# Patient Record
Sex: MALE | Race: OTHER | Employment: FULL TIME | URBAN - METROPOLITAN AREA
[De-identification: names, ages, dates, MRNs, and addresses within clinical notes are randomized per-mention and may not be internally consistent; named-entity substitution may affect disease eponyms.]

---

## 2023-07-06 ENCOUNTER — HOSPITAL ENCOUNTER (OUTPATIENT)
Age: 48
Discharge: HOME | End: 2023-07-06
Payer: COMMERCIAL

## 2023-07-06 VITALS
RESPIRATION RATE: 16 BRPM | DIASTOLIC BLOOD PRESSURE: 82 MMHG | HEART RATE: 94 BPM | OXYGEN SATURATION: 100 % | TEMPERATURE: 98 F | SYSTOLIC BLOOD PRESSURE: 151 MMHG

## 2023-07-06 DIAGNOSIS — N48.1 BALANITIS: Primary | ICD-10-CM

## 2023-07-06 PROCEDURE — 99203 OFFICE O/P NEW LOW 30 MIN: CPT | Performed by: NURSE PRACTITIONER

## 2023-07-06 RX ORDER — FLUCONAZOLE 150 MG/1
150 TABLET ORAL ONCE
Qty: 2 TABLET | Refills: 0 | Status: SHIPPED | OUTPATIENT
Start: 2023-07-06 | End: 2023-07-06

## 2023-07-06 RX ORDER — CLOTRIMAZOLE 1 %
1 CREAM (GRAM) TOPICAL 2 TIMES DAILY
Qty: 12 G | Refills: 0 | Status: SHIPPED | OUTPATIENT
Start: 2023-07-06 | End: 2023-07-13

## 2023-07-06 NOTE — ED INITIAL ASSESSMENT (HPI)
Patient c/o painful red rash on penis which he noticed ~ 2 weeks ago.  States had sexual intercourse 3 weeks ago and symptoms started after

## 2023-07-06 NOTE — DISCHARGE INSTRUCTIONS
Please pull the foreskin back and keep the area clean and dry. Apply ointment as prescribed.   Follow-up with primary care provider

## 2023-07-07 LAB
C TRACH DNA SPEC QL NAA+PROBE: NEGATIVE
N GONORRHOEA DNA SPEC QL NAA+PROBE: NEGATIVE

## 2024-01-22 ENCOUNTER — HOSPITAL ENCOUNTER (OUTPATIENT)
Age: 49
Discharge: HOME | End: 2024-01-22
Payer: COMMERCIAL

## 2024-01-22 ENCOUNTER — LAB ENCOUNTER (OUTPATIENT)
Dept: LAB | Age: 49
End: 2024-01-22
Attending: STUDENT IN AN ORGANIZED HEALTH CARE EDUCATION/TRAINING PROGRAM
Payer: COMMERCIAL

## 2024-01-22 ENCOUNTER — APPOINTMENT (OUTPATIENT)
Dept: GENERAL RADIOLOGY | Age: 49
End: 2024-01-22
Attending: NURSE PRACTITIONER
Payer: COMMERCIAL

## 2024-01-22 ENCOUNTER — OFFICE VISIT (OUTPATIENT)
Dept: INTERNAL MEDICINE CLINIC | Facility: CLINIC | Age: 49
End: 2024-01-22

## 2024-01-22 VITALS — HEART RATE: 89 BPM | DIASTOLIC BLOOD PRESSURE: 73 MMHG | SYSTOLIC BLOOD PRESSURE: 124 MMHG

## 2024-01-22 VITALS
TEMPERATURE: 100 F | OXYGEN SATURATION: 99 % | RESPIRATION RATE: 18 BRPM | SYSTOLIC BLOOD PRESSURE: 111 MMHG | DIASTOLIC BLOOD PRESSURE: 67 MMHG | HEART RATE: 97 BPM

## 2024-01-22 DIAGNOSIS — Z76.89 ENCOUNTER TO ESTABLISH CARE WITH NEW DOCTOR: ICD-10-CM

## 2024-01-22 DIAGNOSIS — E11.69 TYPE 2 DIABETES MELLITUS WITH OTHER SPECIFIED COMPLICATION, UNSPECIFIED WHETHER LONG TERM INSULIN USE (HCC): ICD-10-CM

## 2024-01-22 DIAGNOSIS — E78.5 DYSLIPIDEMIA: ICD-10-CM

## 2024-01-22 DIAGNOSIS — Z76.89 ENCOUNTER TO ESTABLISH CARE WITH NEW DOCTOR: Primary | ICD-10-CM

## 2024-01-22 DIAGNOSIS — E11.9 NEW ONSET TYPE 2 DIABETES MELLITUS (HCC): Primary | ICD-10-CM

## 2024-01-22 DIAGNOSIS — B34.9 VIRAL ILLNESS: ICD-10-CM

## 2024-01-22 DIAGNOSIS — E87.1 HYPONATREMIA: ICD-10-CM

## 2024-01-22 DIAGNOSIS — D72.829 LEUKOCYTOSIS, UNSPECIFIED TYPE: ICD-10-CM

## 2024-01-22 LAB
#MXD IC: 1.2 X10ˆ3/UL (ref 0.1–1)
ALBUMIN SERPL-MCNC: 4.6 G/DL (ref 3.2–4.8)
ALBUMIN/GLOB SERPL: 1.4 {RATIO} (ref 1–2)
ALP LIVER SERPL-CCNC: 92 U/L
ALT SERPL-CCNC: 24 U/L
ANION GAP SERPL CALC-SCNC: 9 MMOL/L (ref 0–18)
AST SERPL-CCNC: 18 U/L (ref ?–34)
BASOPHILS # BLD AUTO: 0.04 X10(3) UL (ref 0–0.2)
BASOPHILS NFR BLD AUTO: 0.4 %
BILIRUB SERPL-MCNC: 0.7 MG/DL (ref 0.3–1.2)
BILIRUB UR QL STRIP: NEGATIVE
BUN BLD-MCNC: 15 MG/DL (ref 7–18)
BUN BLD-MCNC: 15 MG/DL (ref 9–23)
BUN/CREAT SERPL: 10.9 (ref 10–20)
CALCIUM BLD-MCNC: 9.5 MG/DL (ref 8.7–10.4)
CARTRIDGE EXPIRATION DATE: ABNORMAL DATE
CARTRIDGE LOT#: ABNORMAL NUMERIC
CHLORIDE BLD-SCNC: 96 MMOL/L (ref 98–112)
CHLORIDE SERPL-SCNC: 98 MMOL/L (ref 98–112)
CHOLEST SERPL-MCNC: 172 MG/DL (ref ?–200)
CLARITY UR: CLEAR
CO2 BLD-SCNC: 26 MMOL/L (ref 21–32)
CO2 SERPL-SCNC: 26 MMOL/L (ref 21–32)
COLOR UR: YELLOW
CREAT BLD-MCNC: 0.8 MG/DL
CREAT BLD-MCNC: 1.37 MG/DL
CREAT UR-SCNC: 38.7 MG/DL
DEPRECATED RDW RBC AUTO: 36.8 FL (ref 35.1–46.3)
EGFRCR SERPLBLD CKD-EPI 2021: 109 ML/MIN/1.73M2 (ref 60–?)
EGFRCR SERPLBLD CKD-EPI 2021: 64 ML/MIN/1.73M2 (ref 60–?)
EOSINOPHIL # BLD AUTO: 0.04 X10(3) UL (ref 0–0.7)
EOSINOPHIL NFR BLD AUTO: 0.4 %
ERYTHROCYTE [DISTWIDTH] IN BLOOD BY AUTOMATED COUNT: 11.4 % (ref 11–15)
FASTING PATIENT LIPID ANSWER: NO
FASTING STATUS PATIENT QL REPORTED: NO
GLOBULIN PLAS-MCNC: 3.2 G/DL (ref 2.8–4.4)
GLUCOSE BLD-MCNC: 292 MG/DL (ref 70–99)
GLUCOSE BLD-MCNC: 501 MG/DL (ref 70–99)
GLUCOSE UR STRIP-MCNC: 500 MG/DL
HCT VFR BLD AUTO: 41.6 %
HCT VFR BLD AUTO: 45.1 %
HCT VFR BLD CALC: 51 %
HDLC SERPL-MCNC: 34 MG/DL (ref 40–59)
HEMOGLOBIN A1C: 12.7 % (ref 4.3–5.6)
HGB BLD-MCNC: 14.6 G/DL
HGB BLD-MCNC: 15.6 G/DL
IMM GRANULOCYTES # BLD AUTO: 0.03 X10(3) UL (ref 0–1)
IMM GRANULOCYTES NFR BLD: 0.3 %
INSULIN SERPL-ACNC: 15.1 MU/L (ref 3–25)
ISTAT IONIZED CALCIUM FOR CHEM 8: 1.19 MMOL/L (ref 1.12–1.32)
KETONES UR STRIP-MCNC: 40 MG/DL
LDLC SERPL CALC-MCNC: 96 MG/DL (ref ?–100)
LEUKOCYTE ESTERASE UR QL STRIP: NEGATIVE
LYMPHOCYTES # BLD AUTO: 1.44 X10(3) UL (ref 1–4)
LYMPHOCYTES # BLD AUTO: 1.5 X10ˆ3/UL (ref 1–4)
LYMPHOCYTES NFR BLD AUTO: 10.9 %
LYMPHOCYTES NFR BLD AUTO: 13.5 %
MCH RBC QN AUTO: 29.8 PG (ref 26–34)
MCH RBC QN AUTO: 30.6 PG (ref 26–34)
MCHC RBC AUTO-ENTMCNC: 34.6 G/DL (ref 31–37)
MCHC RBC AUTO-ENTMCNC: 35.1 G/DL (ref 31–37)
MCV RBC AUTO: 86.1 FL (ref 80–100)
MCV RBC AUTO: 87.2 FL
MICROALBUMIN UR-MCNC: 1.8 MG/DL
MICROALBUMIN/CREAT 24H UR-RTO: 46.5 UG/MG (ref ?–30)
MIXED CELL %: 8.9 %
MONOCYTES # BLD AUTO: 1.06 X10(3) UL (ref 0.1–1)
MONOCYTES NFR BLD AUTO: 9.9 %
NEUTROPHILS # BLD AUTO: 10.7 X10ˆ3/UL (ref 1.5–7.7)
NEUTROPHILS # BLD AUTO: 8.08 X10 (3) UL (ref 1.5–7.7)
NEUTROPHILS # BLD AUTO: 8.08 X10(3) UL (ref 1.5–7.7)
NEUTROPHILS NFR BLD AUTO: 75.5 %
NEUTROPHILS NFR BLD AUTO: 80.2 %
NITRITE UR QL STRIP: NEGATIVE
NONHDLC SERPL-MCNC: 138 MG/DL (ref ?–130)
OSMOLALITY SERPL CALC.SUM OF ELEC: 299 MOSM/KG (ref 275–295)
PH UR STRIP: 5.5 [PH]
PLATELET # BLD AUTO: 165 10(3)UL (ref 150–450)
PLATELET # BLD AUTO: 171 X10ˆ3/UL (ref 150–450)
POCT INFLUENZA A: NEGATIVE
POCT INFLUENZA B: NEGATIVE
POTASSIUM BLD-SCNC: 4.4 MMOL/L (ref 3.6–5.1)
POTASSIUM SERPL-SCNC: 4.3 MMOL/L (ref 3.5–5.1)
PROT SERPL-MCNC: 7.8 G/DL (ref 5.7–8.2)
PROT UR STRIP-MCNC: 100 MG/DL
RBC # BLD AUTO: 4.77 X10(6)UL
RBC # BLD AUTO: 5.24 X10ˆ6/UL
S PYO AG THROAT QL: NEGATIVE
SARS-COV-2 RNA RESP QL NAA+PROBE: NOT DETECTED
SODIUM BLD-SCNC: 135 MMOL/L (ref 136–145)
SODIUM SERPL-SCNC: 133 MMOL/L (ref 136–145)
SP GR UR STRIP: 1.02
TRIGL SERPL-MCNC: 249 MG/DL (ref 30–149)
UROBILINOGEN UR STRIP-ACNC: <2 MG/DL
VLDLC SERPL CALC-MCNC: 41 MG/DL (ref 0–30)
WBC # BLD AUTO: 10.7 X10(3) UL (ref 4–11)
WBC # BLD AUTO: 13.4 X10ˆ3/UL (ref 4–11)

## 2024-01-22 PROCEDURE — 80061 LIPID PANEL: CPT

## 2024-01-22 PROCEDURE — 86341 ISLET CELL ANTIBODY: CPT

## 2024-01-22 PROCEDURE — A9150 MISC/EXPER NON-PRESCRIPT DRU: HCPCS | Performed by: NURSE PRACTITIONER

## 2024-01-22 PROCEDURE — 87502 INFLUENZA DNA AMP PROBE: CPT | Performed by: NURSE PRACTITIONER

## 2024-01-22 PROCEDURE — 81002 URINALYSIS NONAUTO W/O SCOPE: CPT | Performed by: NURSE PRACTITIONER

## 2024-01-22 PROCEDURE — 3060F POS MICROALBUMINURIA REV: CPT | Performed by: NURSE PRACTITIONER

## 2024-01-22 PROCEDURE — U0002 COVID-19 LAB TEST NON-CDC: HCPCS | Performed by: NURSE PRACTITIONER

## 2024-01-22 PROCEDURE — 82043 UR ALBUMIN QUANTITATIVE: CPT

## 2024-01-22 PROCEDURE — 71046 X-RAY EXAM CHEST 2 VIEWS: CPT | Performed by: NURSE PRACTITIONER

## 2024-01-22 PROCEDURE — 80053 COMPREHEN METABOLIC PANEL: CPT

## 2024-01-22 PROCEDURE — 85025 COMPLETE CBC W/AUTO DIFF WBC: CPT | Performed by: NURSE PRACTITIONER

## 2024-01-22 PROCEDURE — 3061F NEG MICROALBUMINURIA REV: CPT | Performed by: NURSE PRACTITIONER

## 2024-01-22 PROCEDURE — 84681 ASSAY OF C-PEPTIDE: CPT

## 2024-01-22 PROCEDURE — 83525 ASSAY OF INSULIN: CPT

## 2024-01-22 PROCEDURE — 99214 OFFICE O/P EST MOD 30 MIN: CPT | Performed by: NURSE PRACTITIONER

## 2024-01-22 PROCEDURE — 36415 COLL VENOUS BLD VENIPUNCTURE: CPT

## 2024-01-22 PROCEDURE — 86337 INSULIN ANTIBODIES: CPT

## 2024-01-22 PROCEDURE — 87880 STREP A ASSAY W/OPTIC: CPT | Performed by: NURSE PRACTITIONER

## 2024-01-22 PROCEDURE — 80047 BASIC METABLC PNL IONIZED CA: CPT | Performed by: NURSE PRACTITIONER

## 2024-01-22 PROCEDURE — 82570 ASSAY OF URINE CREATININE: CPT

## 2024-01-22 RX ORDER — IBUPROFEN 600 MG/1
600 TABLET ORAL ONCE
Status: COMPLETED | OUTPATIENT
Start: 2024-01-22 | End: 2024-01-22

## 2024-01-22 RX ORDER — ROSUVASTATIN CALCIUM 20 MG/1
20 TABLET, COATED ORAL NIGHTLY
Qty: 90 TABLET | Refills: 3 | Status: SHIPPED | OUTPATIENT
Start: 2024-01-22 | End: 2025-01-16

## 2024-01-22 RX ORDER — SODIUM CHLORIDE 9 MG/ML
1000 INJECTION, SOLUTION INTRAVENOUS ONCE
Status: COMPLETED | OUTPATIENT
Start: 2024-01-22 | End: 2024-01-22

## 2024-01-22 RX ORDER — INSULIN DEGLUDEC 100 U/ML
10 INJECTION, SOLUTION SUBCUTANEOUS DAILY
Qty: 1 EACH | Refills: 1 | Status: SHIPPED | OUTPATIENT
Start: 2024-01-22 | End: 2024-04-21

## 2024-01-22 RX ORDER — SEMAGLUTIDE 0.68 MG/ML
INJECTION, SOLUTION SUBCUTANEOUS
Qty: 3 ML | Refills: 0 | Status: SHIPPED | OUTPATIENT
Start: 2024-01-22 | End: 2024-03-05

## 2024-01-22 RX ORDER — ACETAMINOPHEN 500 MG
1000 TABLET ORAL ONCE
Status: COMPLETED | OUTPATIENT
Start: 2024-01-22 | End: 2024-01-22

## 2024-01-22 RX ORDER — PEN NEEDLE, DIABETIC 30 GX3/16"
1 NEEDLE, DISPOSABLE MISCELLANEOUS
Qty: 400 EACH | Refills: 9 | Status: SHIPPED | OUTPATIENT
Start: 2024-01-22 | End: 2024-04-21

## 2024-01-22 RX ORDER — LANCETS 33 GAUGE
1 EACH MISCELLANEOUS 2 TIMES DAILY
Qty: 200 EACH | Refills: 2 | Status: SHIPPED | OUTPATIENT
Start: 2024-01-22 | End: 2024-05-01

## 2024-01-22 RX ORDER — BLOOD SUGAR DIAGNOSTIC
1 STRIP MISCELLANEOUS 2 TIMES DAILY
Qty: 200 STRIP | Refills: 0 | Status: SHIPPED | OUTPATIENT
Start: 2024-01-22 | End: 2024-05-01

## 2024-01-22 RX ORDER — BLOOD-GLUCOSE METER
1 EACH MISCELLANEOUS 2 TIMES DAILY
Qty: 1 KIT | Refills: 0 | Status: SHIPPED | OUTPATIENT
Start: 2024-01-22

## 2024-01-22 NOTE — PROGRESS NOTES
OFFICE NOTE     Patient ID: Dawson Ibarra is a 48 year old male.  Today's Date: 01/22/24  Chief Complaint: Urgent Care F/u    Sheree  [id number]    Pt is a 47y/o Senegalese speaking male whom presents to clinic to establish care after being seen in urgent care earlier today. Pt reports of fever, fatigue,myalgia and headache, and abdominal pain without nausea/vomiting, diarrhea or constipation.   Pt reports polydipsia and polyuria, cough for 2 months with difficulty sleeping at night due to cough.   Patient denies any chest pain, dyspnea on exertion, nor hemoptysis.     Diabetes  He presents for his initial diabetic visit. Hypoglycemia symptoms include nervousness/anxiousness. Pertinent negatives for hypoglycemia include no confusion, dizziness, headaches, hunger, mood changes, pallor, seizures, sleepiness, speech difficulty, sweats or tremors. Associated symptoms include fatigue, polydipsia, polyphagia, polyuria, weakness and weight loss. Pertinent negatives for diabetes include no blurred vision, no chest pain and no foot ulcerations. There are no hypoglycemic complications. There are no diabetic complications. Risk factors for coronary artery disease include male sex, stress, family history, dyslipidemia and diabetes mellitus. When asked about current treatments, none were reported.         Vitals:    01/22/24 1439   BP: 124/73   Pulse: 89     body mass index is unknown because there is no height or weight on file.  BP Readings from Last 3 Encounters:   01/22/24 124/73   01/22/24 111/67   07/06/23 151/82     The ASCVD Risk score (Laureano DK, et al., 2019) failed to calculate for the following reasons:    Cannot find a previous HDL lab    Cannot find a previous total cholesterol lab      Medications reviewed:  Current Outpatient Medications   Medication Sig Dispense Refill    Blood Glucose Monitoring Suppl (ONETOUCH VERIO) w/Device Does not apply Kit 1 strip by In Vitro route in the morning and 1  strip before bedtime. 1 kit 0    Glucose Blood (ONETOUCH VERIO) In Vitro Strip 1 strip by In Vitro route in the morning and 1 strip before bedtime. 200 strip 0    OneTouch Delica Lancets 33G Does not apply Misc Inject 1 Lancet as directed in the morning and 1 Lancet before bedtime. 200 each 2    Insulin Degludec (TRESIBA) 100 UNIT/ML Subcutaneous Solution Inject 10 Units into the skin daily. 1 each 1    Insulin Pen Needle 33G X 4 MM Does not apply Misc 1 Application daily. 100 each 3    metFORMIN 500 MG Oral Tab Take 1 tablet (500 mg total) by mouth 2 (two) times daily with meals for 30 days, THEN 2 tablets (1,000 mg total) 2 (two) times daily with meals. 300 tablet 0    Insulin Pen Needle (PEN NEEDLES) 32G X 4 MM Does not apply Misc 1 each 4 (four) times daily before meals and nightly. 400 each 9    semaglutide (OZEMPIC, 0.25 OR 0.5 MG/DOSE,) 2 MG/3ML Subcutaneous Solution Pen-injector Inject 0.25 mg into the skin once a week for 30 days, THEN 0.5 mg once a week for 14 days. SEE ME AFTER WEEK 4 FOR REFILLS/ADJUSTMENT. NO REFILLS ON THIS RX.. 3 mL 0    rosuvastatin 20 MG Oral Tab Take 1 tablet (20 mg total) by mouth nightly. FOR CHOLESTEROL. 90 tablet 3         Assessment & Plan    1. Encounter to establish care with new doctor (Primary)  -     POC Glycohemoglobin [76153]  -     Comp Metabolic Panel (14); Future; Expected date: 01/22/2024  -     CBC With Differential With Platelet; Future; Expected date: 01/22/2024  2. Type 2 diabetes mellitus with other specified complication, unspecified whether long term insulin use (HCC)  -     POC Glycohemoglobin [[id number]]  -     OPHTHALMOLOGY - INTERNAL  -     PODIATRY - INTERNAL  -     PCV20 VACCINE FOR INTRAMUSCULAR USE  -     Microalb/Creat Ratio, Random Urine; Future; Expected date: 01/22/2024  -     Lipid Panel; Future; Expected date: 01/22/2024  -     OneTouch Verio; 1 strip by In Vitro route in the morning and 1 strip before bedtime.  Dispense: 1 kit; Refill: 0  -      OneTouch Verio; 1 strip by In Vitro route in the morning and 1 strip before bedtime.  Dispense: 200 strip; Refill: 0  -     OneTouch Delica Lancets 33G; Inject 1 Lancet as directed in the morning and 1 Lancet before bedtime.  Dispense: 200 each; Refill: 2  -     Diabetic Retinopathy Exam; Future; Expected date: 01/22/2024  -     TONI-65 Autoantibody; Future; Expected date: 01/22/2024  -     INSULIN AND C-PEPTIDE, SERUM; Future; Expected date: 01/22/2024  -     Insulin Antibodies; Future; Expected date: 01/22/2024  -     Insulin Degludec; Inject 10 Units into the skin daily.  Dispense: 1 each; Refill: 1  -     Insulin Pen Needle; 1 Application daily.  Dispense: 100 each; Refill: 3  -     ENDOCRINOLOGY - INTERNAL  -     metFORMIN HCl; Take 1 tablet (500 mg total) by mouth 2 (two) times daily with meals for 30 days, THEN 2 tablets (1,000 mg total) 2 (two) times daily with meals.  Dispense: 300 tablet; Refill: 0  -     Pen Needles; 1 each 4 (four) times daily before meals and nightly.  Dispense: 400 each; Refill: 9  -     Referral to [facility]  -     Diabetes Navigator  -     Ozempic (0.25 or 0.5 MG/DOSE); Inject 0.25 mg into the skin once a week for 30 days, THEN 0.5 mg once a week for 14 days. SEE ME AFTER WEEK 4 FOR REFILLS/ADJUSTMENT. NO REFILLS ON THIS RX..  Dispense: 3 mL; Refill: 0  -     Rosuvastatin Calcium; Take 1 tablet (20 mg total) by mouth nightly. FOR CHOLESTEROL.  Dispense: 90 tablet; Refill: 3  -     Comp Metabolic Panel (14); Future; Expected date: 01/22/2024  -     CBC With Differential With Platelet; Future; Expected date: 01/22/2024  Given FMHx and Clinical presention pt with DM given recent Last A1c value was 12.7% done 1/22/2024.      Plan:  -If unclear if DM1 or 2 order TONI, Insulin and C-Peptide.   -ordered fingerstick glucometer, lancets and test strips daily  Metformin start with 500 mg daily for a week, increase to 500 mg twice a day for a week, then 1000 mg at night and 500  mg in the morning for a week, then increase to 1000 mg twice day. If getting diarrhea, wait and do not increase the dose till the diarrhea resolves.   --Given patients elevated Last A1c value was 12.7% done 1/22/2024.   patient would be a candidate for GLP-1 agnoist , discussed preclusion criteria that patient does not have a history of Pancreatitis.  Will start Ozempic (2.5mcg/subcutaneous weekly Monday for next 4 weeks, then titrate to 5.0mcg/weekly Feb 19th, 2024)  -Given elevated A1C, unlikely to reduce with po pills alone. Start long acting Insulin Tresiba 10units daily  -prescribe Glucometer test kit, Monitor ACHS and keep log.  Let us know if any medication is not covered so we change to preferred agent by insurance.   -Referral given to Endocrine, next available for further evaluation   -Refer for retinal screening (if inconclusive) refer to opthalomology  -Refer to Podiatry for rountine screening for diabetic patients  -[place] Prevnar today in clinic  -Refer to diabetes educator and Diabetes navigator  -Follow up in 2-4 weeks pending clinical improvement   3. Dyslipidemia  -     Rosuvastatin Calcium; Take 1 tablet (20 mg total) by mouth nightly. FOR CHOLESTEROL.  Dispense: 90 tablet; Refill: 3  Given newly diagnosed DM, will need high intenstity statin for Cardioprotective effect. Start rousvastatin 20mg nightly indefinitely.       Follow Up: As needed/if symptoms worsen or Return in about 4 weeks (around 2/19/2024) for Diabetes and Physical Exam..         Objective/ Results:   Physical Exam  Constitutional:       Appearance: He is well-developed.   Cardiovascular:      Rate and Rhythm: Normal rate and regular rhythm.      Heart sounds: Normal heart sounds.   Pulmonary:      Effort: Pulmonary effort is normal.      Breath sounds: Normal breath sounds.   Abdominal:      General: Bowel sounds are normal.      Palpations: Abdomen is soft.   Skin:     General: Skin is warm and dry.   Neurological:       Mental Status: He is alert and oriented to person, place, and time.      Deep Tendon Reflexes: Reflexes are normal and symmetric.        Reviewed:    There are no problems to display for this patient.     No Known Allergies     Social History     Socioeconomic History    Marital status:    Tobacco Use    Smoking status: Never    Smokeless tobacco: Never   Vaping Use    Vaping Use: Never used   Substance and Sexual Activity    Alcohol use: Yes     Comment: social    Drug use: Never      Review of Systems   Constitutional:  Positive for fatigue and weight loss.   Eyes:  Negative for blurred vision.   Respiratory: Negative.     Cardiovascular: Negative.  Negative for chest pain.   Gastrointestinal: Negative.    Endocrine: Positive for polydipsia, polyphagia and polyuria.   Skin: Negative.  Negative for pallor.   Neurological:  Positive for weakness. Negative for dizziness, tremors, seizures, speech difficulty and headaches.   Psychiatric/Behavioral:  Negative for confusion. The patient is nervous/anxious.      All other systems negative unless otherwise stated in ROS or HPI above.       Santo Graham MD  Internal Medicine       Call office with any questions or seek emergency care if necessary.   Patient understands and agrees to follow directions and advice.      ----------------------------------------- PATIENT INSTRUCTIONS-----------------------------------------     There are no Patient Instructions on file for this visit.

## 2024-01-22 NOTE — ED INITIAL ASSESSMENT (HPI)
Pt reports fever, headache, stomach pain beginning 2 weeks ago. Denies n/v/d.  Reports subjective temps, didn't take temperature.  Reports hx of abdominal hernia.

## 2024-01-22 NOTE — ED PROVIDER NOTES
Patient Seen in: [facility]    History   CC: fever  HPI: Dawson Ibarra 48 year old male  who presents c/o fever, generalized fatigue and myalgias, headaches, abd pains without n/v/d/c. +increased thirst and urination per pt x2wks. Denies dysuria, hesitancy, hematuria. Denies rash. +cough x2mo. Difficulty sleeping at night d/t cough. Denies cp, chaitanya, hemoptysis.   [id number] used for interview and exam.    History reviewed. No pertinent past medical history.    History reviewed. No pertinent surgical history.    No family history on file.    Social History     Socioeconomic History    Marital status:    Tobacco Use    Smoking status: Never    Smokeless tobacco: Never       ROS:  Review of Systems    Positive for stated complaint: abdominal pain  Other systems are as noted in HPI.  Constitutional and vital signs reviewed.      All other systems reviewed and negative except as noted above.    PSFH elements reviewed from today and agreed except as otherwise stated in HPI.             Constitutional and vital signs reviewed.        Physical Exam     ED Triage Vitals [01/22/24 1024]   /85   Pulse 107   Resp 16   Temp 98.4 °F (36.9 °C)   Temp src Temporal   SpO2 100 %   O2 Device None (Room air)       Current:/67   Pulse 97   Temp 99.7 °F (37.6 °C) (Oral)   Resp 18   SpO2 99%         PE:  General - Appears uncomfortable however non-toxic and in NAD  Head - Appears symmetrical without deformity/swelling cranium, scalp, or facial bones  Eyes - PERRL, sclera not injected, no discharge noted, no periorbital edema,   ENT - EAC bilaterally without discharge, TM pearly grey with COL visualized appropriately bilaterally.   no nasal drainage noted in nares bilat, no cobblestoning to post. Pharynx.   Oropharynx clear, posterior pharynx is without erythema and without tonsilar enlargement or exudate, uvula midline, +gag, voice is clear. No trismus  Neck - no significant  adenopathy, supple with trachea midline  Resp - Lung sounds clear bilaterally and wob unlabored, good aeration with equal, even expansion bilaterally   CV - RRR  GI - Appears round and flat, BS +x4 quadrants, no tenderness/guarding with palpation   - no CVA tenderness  Skin - no rashes or petechiae noted, pink warm and dry throughout, mmm, cap refill <2seconds  Neuro - A&O x4, sensation equal to both medial and lateral aspects of extremities, steady gait  MSK - makes purposeful movements of all extremities  Psych - Interactive and appropriate      ED Course     Labs Reviewed   POCT CBC - Abnormal; Notable for the following components:       Result Value    WBC IC 13.4 (*)     # Neutrophil 10.7 (*)     # Mixed Cells 1.2 (*)     All other components within normal limits   POCT ISTAT CHEM8 CARTRIDGE - Abnormal; Notable for the following components:    ISTAT Sodium 135 (*)     ISTAT Chloride 96 (*)     ISTAT Glucose 292 (*)     All other components within normal limits   [facility] POCT URINALYSIS DIPSTICK - Abnormal; Notable for the following components:    Protein urine 100 (*)     Glucose, Urine 500 (*)     Ketone, Urine 40 (*)     Blood, Urine Small (*)     All other components within normal limits   POCT RAPID STREP - Normal   RAPID SARS-COV-2 BY PCR - Normal   POCT FLU TEST - Normal    Narrative:     This assay is a rapid molecular in vitro test utilizing nucleic acid amplification of influenza A and B viral RNA.       [facility]     XR CHEST PA + LAT CHEST (CPT=71046) (Final result)  Result time 01/22/24 11:12:22  Final result by Jonh Huber MD (01/22/24 11:12:22)                Impression:    CONCLUSION: Normal           Dictated by (CST): Iftikhar Huber MD on 1/22/2024 at 11:11 AM      Finalized by (CST): Iftikhar Huber MD on 1/22/2024 at 11:12 AM                  Narrative:    PROCEDURE: XR CHEST PA + LAT CHEST (CPT=71046)     COMPARISON: None.     INDICATIONS: Fever, dizziness, dry cough, fatigue, sob, and mid  abdominal pain x 4 months.  History of smoking.     TECHNIQUE:   Two views.       FINDINGS:  Normal heart size, clear lungs, normal pleura                 Chest x-ray as noted above without acute intrathoracic process.  Strep negative.  Rapid COVID PCR negative.  Influenza negative.  Mild leukocytosis at 13.4, hyponatremia at 135, hypochloremia at 96 and hyperglycemia with a value of 292.  Patient is not acidotic, normal bicarb.  500 glucose in the urine.  Urine not significant for infection.  No longer febrile after Tylenol/Motrin given in immediate care.  States he feels significant improvement with fever reduction.    Discussed with patient new onset type 2 diabetes with concomitant viral respiratory infection.  Discussed low-dose daily metformin however close follow-up with primary care and has additional monitoring/treatment/diagnostics and diet counseling will be needed.  Last saw a doctor in 2016 through [facility].  I called this practice and the earliest available appointment through [facility] is in 10 days in [place].  Able to schedule an appointment through [facility] today for 2:40 PM in this building.  Patient is agreeable.  Advised strict ED/return precautions.  This case was also discussed with Dr. Rea who agrees with plan of care.   [id number] used for discharge and results discussion.      Disposition and Plan     Clinical Impression:  1. New onset type 2 diabetes mellitus (HCC)    2. Viral illness    3. Hyponatremia    4. Leukocytosis, unspecified type        Disposition:  Discharge    Follow-up:  Dr. James Flores  11:15am    [address]  [place], [place]  [phone]        Santo Graham MD  [address]  [facility] [address]  [phone]    Go today  2:40pm      Medications Prescribed:  Discharge Medication List as of 1/22/2024 12:03 PM        START taking these medications    Details   metFORMIN 500 MG Oral Tab Take 1  tablet (500 mg total) by mouth daily. Take with Dinner, Normal, Disp-30 tablet, R-0

## 2024-01-22 NOTE — DISCHARGE INSTRUCTIONS
Take the metformin nightly with dinner. Make sure to stay well hydrated with clear fluids. You can use Tylenol or Motrin every 6 hours to control fever or discomfort. You can use both Tylenol and Motrin, but alternate them so that you're getting one every 3 hours. Warm salt water gargles, throat lozenges, and warmed beverages can be additionally helpful for a sore throat. Using a humidifier in the bedroom at night, and sleeping propped up on pillows/somewhat of an incline can also be helpful. Cover your cough and wash your hands frequently to prevent the spread of infection. Follow up at the appointment scheduled for you as directed in your discharge instructions.  You are on a wait list to be seen sooner and will be notified if earlier appointment available. Seek additional care in the ER for fever that is not controlled with Tylenol and Motrin, difficulty breathing or shortness of breath, chest pain, or any new/worsening symptoms.

## 2024-01-23 ENCOUNTER — HOSPITAL ENCOUNTER (EMERGENCY)
Facility: HOSPITAL | Age: 49
Discharge: HOME | End: 2024-01-23
Attending: EMERGENCY MEDICINE
Payer: COMMERCIAL

## 2024-01-23 ENCOUNTER — TELEPHONE (OUTPATIENT)
Dept: ENDOCRINOLOGY | Facility: HOSPITAL | Age: 49
End: 2024-01-23

## 2024-01-23 ENCOUNTER — HOSPITAL ENCOUNTER (OUTPATIENT)
Age: 49
Discharge: ANOTHER HOSPITAL | End: 2024-01-23
Payer: COMMERCIAL

## 2024-01-23 VITALS
OXYGEN SATURATION: 98 % | SYSTOLIC BLOOD PRESSURE: 147 MMHG | TEMPERATURE: 99 F | RESPIRATION RATE: 18 BRPM | HEART RATE: 109 BPM | WEIGHT: 153.25 LBS | DIASTOLIC BLOOD PRESSURE: 88 MMHG

## 2024-01-23 VITALS
SYSTOLIC BLOOD PRESSURE: 145 MMHG | TEMPERATURE: 100 F | DIASTOLIC BLOOD PRESSURE: 85 MMHG | RESPIRATION RATE: 20 BRPM | OXYGEN SATURATION: 99 % | HEART RATE: 106 BPM

## 2024-01-23 DIAGNOSIS — R06.00 DYSPNEA, UNSPECIFIED TYPE: Primary | ICD-10-CM

## 2024-01-23 DIAGNOSIS — R03.0 ELEVATED BLOOD PRESSURE READING: ICD-10-CM

## 2024-01-23 DIAGNOSIS — E11.9 DIABETES MELLITUS, NEW ONSET (HCC): ICD-10-CM

## 2024-01-23 DIAGNOSIS — R11.2 NAUSEA AND VOMITING IN ADULT: ICD-10-CM

## 2024-01-23 DIAGNOSIS — R10.9 ABDOMINAL PAIN, ACUTE: ICD-10-CM

## 2024-01-23 DIAGNOSIS — B34.9 VIRAL SYNDROME: Primary | ICD-10-CM

## 2024-01-23 LAB
ANION GAP SERPL CALC-SCNC: 8 MMOL/L (ref 0–18)
ATRIAL RATE: 97 BPM
BASOPHILS # BLD AUTO: 0.04 X10(3) UL (ref 0–0.2)
BASOPHILS NFR BLD AUTO: 0.3 %
BUN BLD-MCNC: 13 MG/DL (ref 9–23)
BUN/CREAT SERPL: 13.3 (ref 10–20)
CALCIUM BLD-MCNC: 9.9 MG/DL (ref 8.7–10.4)
CHLORIDE SERPL-SCNC: 99 MMOL/L (ref 98–112)
CO2 SERPL-SCNC: 26 MMOL/L (ref 21–32)
CREAT BLD-MCNC: 0.98 MG/DL
DEPRECATED RDW RBC AUTO: 35.3 FL (ref 35.1–46.3)
EGFRCR SERPLBLD CKD-EPI 2021: 95 ML/MIN/1.73M2 (ref 60–?)
EOSINOPHIL # BLD AUTO: 0.02 X10(3) UL (ref 0–0.7)
EOSINOPHIL NFR BLD AUTO: 0.2 %
ERYTHROCYTE [DISTWIDTH] IN BLOOD BY AUTOMATED COUNT: 11.4 % (ref 11–15)
FLUAV + FLUBV RNA SPEC NAA+PROBE: NEGATIVE
FLUAV + FLUBV RNA SPEC NAA+PROBE: NEGATIVE
GLUCOSE BLD-MCNC: 266 MG/DL (ref 70–99)
GLUCOSE BLDC GLUCOMTR-MCNC: 290 MG/DL (ref 70–99)
GLUCOSE BLDC GLUCOMTR-MCNC: 314 MG/DL (ref 70–99)
HCT VFR BLD AUTO: 42.3 %
HGB BLD-MCNC: 15 G/DL
IMM GRANULOCYTES # BLD AUTO: 0.05 X10(3) UL (ref 0–1)
IMM GRANULOCYTES NFR BLD: 0.4 %
LYMPHOCYTES # BLD AUTO: 1.36 X10(3) UL (ref 1–4)
LYMPHOCYTES NFR BLD AUTO: 11.6 %
MCH RBC QN AUTO: 30.1 PG (ref 26–34)
MCHC RBC AUTO-ENTMCNC: 35.5 G/DL (ref 31–37)
MCV RBC AUTO: 84.9 FL
MONOCYTES # BLD AUTO: 0.91 X10(3) UL (ref 0.1–1)
MONOCYTES NFR BLD AUTO: 7.7 %
NEUTROPHILS # BLD AUTO: 9.37 X10 (3) UL (ref 1.5–7.7)
NEUTROPHILS # BLD AUTO: 9.37 X10(3) UL (ref 1.5–7.7)
NEUTROPHILS NFR BLD AUTO: 79.8 %
OSMOLALITY SERPL CALC.SUM OF ELEC: 285 MOSM/KG (ref 275–295)
P AXIS: 26 DEGREES
P-R INTERVAL: 142 MS
PLATELET # BLD AUTO: 175 10(3)UL (ref 150–450)
POTASSIUM SERPL-SCNC: 4 MMOL/L (ref 3.5–5.1)
Q-T INTERVAL: 324 MS
QRS DURATION: 74 MS
QTC CALCULATION (BEZET): 411 MS
R AXIS: 2 DEGREES
RBC # BLD AUTO: 4.98 X10(6)UL
RSV RNA SPEC NAA+PROBE: NEGATIVE
SARS-COV-2 RNA RESP QL NAA+PROBE: NOT DETECTED
SODIUM SERPL-SCNC: 133 MMOL/L (ref 136–145)
T AXIS: 6 DEGREES
VENTRICULAR RATE: 97 BPM
WBC # BLD AUTO: 11.8 X10(3) UL (ref 4–11)

## 2024-01-23 PROCEDURE — 99283 EMERGENCY DEPT VISIT LOW MDM: CPT

## 2024-01-23 PROCEDURE — 85025 COMPLETE CBC W/AUTO DIFF WBC: CPT | Performed by: EMERGENCY MEDICINE

## 2024-01-23 PROCEDURE — 82962 GLUCOSE BLOOD TEST: CPT

## 2024-01-23 PROCEDURE — 36415 COLL VENOUS BLD VENIPUNCTURE: CPT

## 2024-01-23 PROCEDURE — 99214 OFFICE O/P EST MOD 30 MIN: CPT | Performed by: PHYSICIAN ASSISTANT

## 2024-01-23 PROCEDURE — 99284 EMERGENCY DEPT VISIT MOD MDM: CPT

## 2024-01-23 PROCEDURE — 93000 ELECTROCARDIOGRAM COMPLETE: CPT | Performed by: PHYSICIAN ASSISTANT

## 2024-01-23 PROCEDURE — 0241U SARS-COV-2/FLU A AND B/RSV BY PCR (GENEXPERT): CPT | Performed by: EMERGENCY MEDICINE

## 2024-01-23 PROCEDURE — 82962 GLUCOSE BLOOD TEST: CPT | Performed by: PHYSICIAN ASSISTANT

## 2024-01-23 PROCEDURE — 80048 BASIC METABOLIC PNL TOTAL CA: CPT | Performed by: EMERGENCY MEDICINE

## 2024-01-23 RX ORDER — ACETAMINOPHEN 500 MG
1000 TABLET ORAL ONCE
Status: COMPLETED | OUTPATIENT
Start: 2024-01-23 | End: 2024-01-23

## 2024-01-23 RX ORDER — IBUPROFEN 600 MG/1
600 TABLET ORAL ONCE
Status: COMPLETED | OUTPATIENT
Start: 2024-01-23 | End: 2024-01-23

## 2024-01-23 NOTE — ED INITIAL ASSESSMENT (HPI)
Pt was seen at [facility] yesterday and IC.   Dx with Viral URI and new onset dm.  Pt is feeling worse today.  +fatigue, sob, vomiting.    Pt was unable to check his blood sugar.

## 2024-01-23 NOTE — ED PROVIDER NOTES
Patient Seen in: [facility]    History     Chief Complaint   Patient presents with    Fatigue     Stated Complaint: Fatigue    HPI    48-year-old male presents with chief complaint of dyspnea.  Onset yesterday.  Patient reports associated fatigue, nausea, vomiting and abdominal pain.  Patient was newly diagnosed with diabetes yesterday and was seen here at this facility and PCPs office.  Patient states his symptoms have worsened.  Patient denies wheeze, hemoptysis, rash, weakness, paraesthesias, extremity pain, extremity swelling, chest pain, abdominal pain, diarrhea, constipation, melena, hematochezia, dysuria, hematuria, flank pain, scrotal pain, scrotal swelling, scrotal redness.      History reviewed. No pertinent past medical history.    Past Surgical History:   Procedure Laterality Date    HERNIA SURGERY      10 years ago            Family History   Problem Relation Age of Onset    Diabetes Mother     Diabetes Father     Other (infection of foot) Father     Diabetes Sister     Diabetes Brother        Social History     Socioeconomic History    Marital status:    Tobacco Use    Smoking status: Never    Smokeless tobacco: Never   Vaping Use    Vaping Use: Never used   Substance and Sexual Activity    Alcohol use: Yes     Comment: social    Drug use: Never       Review of Systems    Positive for stated complaint: Fatigue  Other systems are as noted in HPI.  Constitutional and vital signs reviewed.      All other systems reviewed and negative except as noted above.    PSFH elements reviewed from today and agreed except as otherwise stated in HPI.    Physical Exam     ED Triage Vitals [01/23/24 1140]   /85   Pulse 106   Resp 20   Temp 99.9 °F (37.7 °C)   Temp src Temporal   SpO2 99 %   O2 Device None (Room air)       Current:/85   Pulse 106   Temp 99.9 °F (37.7 °C) (Temporal)   Resp 20   SpO2 99%      PULSE OX within normal limits on room air as interpreted by this  provider.    Constitutional: The patient is cooperative. Appears well-developed and well-nourished.  Moderate discomfort.  Psychological: Alert, No abnormalities of mood, affect.  Head: Normocephalic/atraumatic.  Eyes: Pupils are equal round reactive to light.  Conjunctiva are within normal limits.  ENT: Oropharynx is clear.  Neck: The neck is supple.  No meningeal signs.  Chest: There is no tenderness to the chest wall.    Respiratory: Respiratory effort was normal.  Decreased lung sounds bilaterally.  There is no rales, wheezes, or rhonchi.  There is no stridor.  Air entry is equal.  Cardiovascular: Tachycardic, regular rhythm.  Capillary refill is brisk.    Gastrointestinal: Positive generalized tenderness to palpation present in all 4 quadrants.  Abdomen soft, nondistended.  There is no rebound tenderness or guarding.  No organomegaly is noted.   Genitourinary: Not examined.  Lymphatic: No gross lymphadenopathy noted.  Musculoskeletal: Musculoskeletal system is grossly intact.  There is no obvious deformity.  Neurological: Gross motor movement is intact in all 4 extremities.  Patient exhibits normal speech.  Skin: Skin is normal to inspection.  Warm and dry.  No obvious bruising.  No obvious rash.        ED Course     Labs Reviewed   POCT GLUCOSE - Abnormal; Notable for the following components:       Result Value    POC Glucose  314 (*)     All other components within normal limits     EKG    Rate, intervals and axes as noted on EKG Report.  Rate: 97  Rhythm: Sinus Rhythm  Reading: No STEMI           [facility]      via [employer] utilized to communicate with patient.    Physical exam remained stable as previously documented.  Available results reviewed with patient.    48-year-old male with history of new onset diabetes presents to immediate care with multiple complaints including dyspnea, nausea, vomiting, abdominal pain, fatigue.  Physical exam findings as document above.  Discussed with  patient need for further evaluation and possible workup in emergency department.  Patient is agreeable.    The patient was informed of their elevated blood pressure reading at immediate care.  They were informed of the dangers of undiagnosed and untreated hypertension.  Education regarding lifestyle modifications and the need for appropriate follow-up with their PCP to have their blood pressure re-checked within 24-48 hours was provided.    Patient case discussed with Dr. Hogan.    Disposition and Plan     Clinical Impression:  1. Dyspnea, unspecified type    2. Diabetes mellitus, new onset (HCC)    3. Abdominal pain, acute    4. Nausea and vomiting in adult    5. Elevated blood pressure reading        Disposition:  Ic to ed    Follow-up:  No follow-up provider specified.    Medications Prescribed:  Discharge Medication List as of 1/23/2024 12:02 PM

## 2024-01-23 NOTE — DISCHARGE INSTRUCTIONS
Report to emergency department immediately      You had elevated blood pressure today and you need to follow-up with your doctor for repeat blood pressure check  If possible check your blood pressure at home and keep a blood pressure log to bring to your physician

## 2024-01-23 NOTE — PROGRESS NOTES
Please relay following to patient (with Maori intepreter):    José Luis Osman,   Your recent blood work shows High sugar which we know is from diabetes and we have started you on metformin twice daily, ozempic once weekly and insulin once daily. Your cholesterol levels are elevated and want you to take a cholesterol daily. Please make sure to follow up with Endocrinologist, Endocrine education and .  I recommend to eat a more balanced mediterranean diet (eat more vegetables and fruits) more omega 3 fatty acids, lean protein (chicken, turkey, seafood), less process/fried fatty foods, less red met, and mixed aerobic exercise 180 minutes a week and intermittent strength training if possible.   -please follow up on 2/24 (already has appointment) or if any worsening or issues follow up sooner.

## 2024-01-23 NOTE — TELEPHONE ENCOUNTER
General: Navigator contacted [facility] to initiate a prior authorization for Ozempic 0.25/0.5mg. Covermymeds key code was provided, clinical notes and lab results were submitted with PA.   Covermymeds:  Key code: [id number]  Last name: Ross Paolaatiya   : 1975

## 2024-01-24 ENCOUNTER — HOSPITAL ENCOUNTER (EMERGENCY)
Facility: HOSPITAL | Age: 49
Discharge: HOME | End: 2024-01-25
Payer: COMMERCIAL

## 2024-01-24 ENCOUNTER — APPOINTMENT (OUTPATIENT)
Dept: GENERAL RADIOLOGY | Facility: HOSPITAL | Age: 49
End: 2024-01-24
Attending: NURSE PRACTITIONER
Payer: COMMERCIAL

## 2024-01-24 DIAGNOSIS — N12 PYELONEPHRITIS: Primary | ICD-10-CM

## 2024-01-24 LAB
ALBUMIN SERPL-MCNC: 4.2 G/DL (ref 3.2–4.8)
ALBUMIN/GLOB SERPL: 1.3 {RATIO} (ref 1–2)
ALP LIVER SERPL-CCNC: 124 U/L
ALT SERPL-CCNC: 26 U/L
ANION GAP SERPL CALC-SCNC: 9 MMOL/L (ref 0–18)
AST SERPL-CCNC: 19 U/L (ref ?–34)
BASOPHILS # BLD AUTO: 0.02 X10(3) UL (ref 0–0.2)
BASOPHILS NFR BLD AUTO: 0.2 %
BILIRUB SERPL-MCNC: 0.7 MG/DL (ref 0.3–1.2)
BILIRUB UR QL: NEGATIVE
BUN BLD-MCNC: 14 MG/DL (ref 9–23)
BUN/CREAT SERPL: 12.5 (ref 10–20)
C-PEPTIDE: 5 NG/ML
CALCIUM BLD-MCNC: 9.3 MG/DL (ref 8.7–10.4)
CHLORIDE SERPL-SCNC: 96 MMOL/L (ref 98–112)
CLARITY UR: CLEAR
CO2 SERPL-SCNC: 23 MMOL/L (ref 21–32)
COLOR UR: YELLOW
CREAT BLD-MCNC: 1.12 MG/DL
D DIMER PPP FEU-MCNC: 0.5 UG/ML FEU (ref ?–0.5)
DEPRECATED RDW RBC AUTO: 33.9 FL (ref 35.1–46.3)
EGFRCR SERPLBLD CKD-EPI 2021: 81 ML/MIN/1.73M2 (ref 60–?)
EOSINOPHIL # BLD AUTO: 0.01 X10(3) UL (ref 0–0.7)
EOSINOPHIL NFR BLD AUTO: 0.1 %
ERYTHROCYTE [DISTWIDTH] IN BLOOD BY AUTOMATED COUNT: 11.2 % (ref 11–15)
GLOBULIN PLAS-MCNC: 3.2 G/DL (ref 2.8–4.4)
GLUCOSE BLD-MCNC: 278 MG/DL (ref 70–99)
GLUCOSE BLDC GLUCOMTR-MCNC: 270 MG/DL (ref 70–99)
GLUCOSE UR-MCNC: >1000 MG/DL
HCT VFR BLD AUTO: 35.7 %
HGB BLD-MCNC: 12.9 G/DL
IMM GRANULOCYTES # BLD AUTO: 0.03 X10(3) UL (ref 0–1)
IMM GRANULOCYTES NFR BLD: 0.3 %
KETONES UR-MCNC: 60 MG/DL
LEUKOCYTE ESTERASE UR QL STRIP.AUTO: 75
LIPASE SERPL-CCNC: 32 U/L (ref 13–75)
LYMPHOCYTES # BLD AUTO: 1.14 X10(3) UL (ref 1–4)
LYMPHOCYTES NFR BLD AUTO: 9.7 %
MCH RBC QN AUTO: 29.9 PG (ref 26–34)
MCHC RBC AUTO-ENTMCNC: 36.1 G/DL (ref 31–37)
MCV RBC AUTO: 82.8 FL
MONOCYTES # BLD AUTO: 0.88 X10(3) UL (ref 0.1–1)
MONOCYTES NFR BLD AUTO: 7.5 %
NEUTROPHILS # BLD AUTO: 9.68 X10 (3) UL (ref 1.5–7.7)
NEUTROPHILS # BLD AUTO: 9.68 X10(3) UL (ref 1.5–7.7)
NEUTROPHILS NFR BLD AUTO: 82.2 %
NITRITE UR QL STRIP.AUTO: NEGATIVE
OSMOLALITY SERPL CALC.SUM OF ELEC: 276 MOSM/KG (ref 275–295)
PH UR: 5.5 [PH] (ref 5–8)
PLATELET # BLD AUTO: 173 10(3)UL (ref 150–450)
POTASSIUM SERPL-SCNC: 4.2 MMOL/L (ref 3.5–5.1)
PROT SERPL-MCNC: 7.4 G/DL (ref 5.7–8.2)
PROT UR-MCNC: 70 MG/DL
RBC # BLD AUTO: 4.31 X10(6)UL
SODIUM SERPL-SCNC: 128 MMOL/L (ref 136–145)
SP GR UR STRIP: >1.03 (ref 1–1.03)
UROBILINOGEN UR STRIP-ACNC: NORMAL
WBC # BLD AUTO: 11.8 X10(3) UL (ref 4–11)

## 2024-01-24 PROCEDURE — 96365 THER/PROPH/DIAG IV INF INIT: CPT

## 2024-01-24 PROCEDURE — 85025 COMPLETE CBC W/AUTO DIFF WBC: CPT

## 2024-01-24 PROCEDURE — 96361 HYDRATE IV INFUSION ADD-ON: CPT

## 2024-01-24 PROCEDURE — 80053 COMPREHEN METABOLIC PANEL: CPT

## 2024-01-24 PROCEDURE — 82962 GLUCOSE BLOOD TEST: CPT

## 2024-01-24 PROCEDURE — 71045 X-RAY EXAM CHEST 1 VIEW: CPT | Performed by: NURSE PRACTITIONER

## 2024-01-24 PROCEDURE — 83690 ASSAY OF LIPASE: CPT

## 2024-01-24 PROCEDURE — 99284 EMERGENCY DEPT VISIT MOD MDM: CPT

## 2024-01-24 PROCEDURE — 81001 URINALYSIS AUTO W/SCOPE: CPT | Performed by: NURSE PRACTITIONER

## 2024-01-24 PROCEDURE — 85379 FIBRIN DEGRADATION QUANT: CPT | Performed by: NURSE PRACTITIONER

## 2024-01-24 PROCEDURE — 0202U NFCT DS 22 TRGT SARS-COV-2: CPT | Performed by: NURSE PRACTITIONER

## 2024-01-24 PROCEDURE — 96375 TX/PRO/DX INJ NEW DRUG ADDON: CPT

## 2024-01-24 RX ORDER — KETOROLAC TROMETHAMINE 15 MG/ML
15 INJECTION, SOLUTION INTRAMUSCULAR; INTRAVENOUS ONCE
Status: COMPLETED | OUTPATIENT
Start: 2024-01-24 | End: 2024-01-24

## 2024-01-24 RX ORDER — ONDANSETRON 2 MG/ML
4 INJECTION INTRAMUSCULAR; INTRAVENOUS ONCE
Status: COMPLETED | OUTPATIENT
Start: 2024-01-24 | End: 2024-01-24

## 2024-01-24 NOTE — TELEPHONE ENCOUNTER
Alysha received PA approval for Ozempic 0.25/0.5mg. Alysha called pharmacy to inform of approval, per pharmacist Ozempic is covered with a $24.99 copay. Patient will be notified by pharmacy when prescription is ready for .     Date: 01/23/2024  FAHEEM JEFFERSON  [address] [address]    RE: We’ve approved your request for coverage of Ozempic (0.25 or 0.5 MG/DOSE)  2MG/3ML SC SOPN.    Dear LAUREN BRUNER:  We’re pleased to let you know that we’ve approved your or your doctor’s request for coverage  for Ozempic (0.25 or 0.5 MG/DOSE) 2MG/3ML SC SOPN. You can now fill your prescription,  and it will be covered according to your plan.  As long as you remain covered by your prescription drug plan and there are no changes to your  plan benefits, this request is approved from 01/23/2024 to 01/22/2027. When this approval  expires, please speak to your doctor about your treatment.  Sincerely,    [facility] [employer]  cc: Dr. FAHEEM JEFFERSON  If you have questions, we want to help.  Call the number on your prescription ID card or in your plan materials to speak with a  representative.

## 2024-01-24 NOTE — ED PROVIDER NOTES
Patient Seen in: Health system Emergency Department    History     Chief Complaint   Patient presents with    Fatigue       HPI    The patient presents to the ED complaining of fatigue for the past few days.  He notes that his glucose levels have been elevated and he recently was diagnosed with diabetes.  He notes associated body aches, nausea, headache and decreased appetite.  What    History reviewed. History reviewed. No pertinent past medical history.    History reviewed.   Past Surgical History:   Procedure Laterality Date    HERNIA SURGERY      10 years ago         Medications :  (Not in a hospital admission)       Family History   Problem Relation Age of Onset    Diabetes Mother     Diabetes Father     Other (infection of foot) Father     Diabetes Sister     Diabetes Brother        Smoking Status:   Social History     Socioeconomic History    Marital status:    Tobacco Use    Smoking status: Never    Smokeless tobacco: Never   Vaping Use    Vaping Use: Never used   Substance and Sexual Activity    Alcohol use: Yes     Comment: social    Drug use: Never       Constitutional and vital signs reviewed.      Social History and Family History elements reviewed from today, pertinent positives to the presenting problem noted.    Physical Exam     ED Triage Vitals [01/23/24 1238]   /81   Pulse 102   Resp 18   Temp 98.5 °F (36.9 °C)   Temp src Temporal   SpO2 98 %   O2 Device None (Room air)       All measures to prevent infection transmission during my interaction with the patient were taken. The patient was already wearing a droplet mask on my arrival to the room. Personal protective equipment was worn throughout the duration of the exam.  Handwashing was performed prior to and after the exam.  Stethoscope and any equipment used during my examination was cleaned with super sani-cloth germicidal wipes following the exam.     Physical Exam  Vitals and nursing note reviewed.   Constitutional:        General: He is not in acute distress.     Appearance: He is well-developed.   HENT:      Head: Normocephalic and atraumatic.   Eyes:      General:         Right eye: No discharge.         Left eye: No discharge.      Conjunctiva/sclera: Conjunctivae normal.   Neck:      Trachea: No tracheal deviation.   Cardiovascular:      Rate and Rhythm: Normal rate and regular rhythm.      Heart sounds: Normal heart sounds.   Pulmonary:      Effort: Pulmonary effort is normal. No respiratory distress.      Breath sounds: Normal breath sounds. No stridor.   Abdominal:      General: There is no distension.      Palpations: Abdomen is soft.   Musculoskeletal:         General: No deformity.      Cervical back: Neck supple. No rigidity.   Skin:     General: Skin is warm and dry.   Neurological:      Mental Status: He is alert and oriented to person, place, and time.   Psychiatric:         Mood and Affect: Mood normal.         Behavior: Behavior normal.         ED Course        Labs Reviewed   BASIC METABOLIC PANEL (8) - Abnormal; Notable for the following components:       Result Value    Glucose 266 (*)     Sodium 133 (*)     All other components within normal limits   POCT GLUCOSE - Abnormal; Notable for the following components:    POC Glucose  290 (*)     All other components within normal limits   CBC W/ DIFFERENTIAL - Abnormal; Notable for the following components:    WBC 11.8 (*)     Neutrophil Absolute Prelim 9.37 (*)     Neutrophil Absolute 9.37 (*)     All other components within normal limits   SARS-COV-2/FLU A AND B/RSV BY PCR (GENEXPERT) - Normal    Narrative:     This test is intended for the qualitative detection and differentiation of SARS-CoV-2, influenza A, influenza B, and respiratory syncytial virus (RSV) viral RNA in nasopharyngeal or nares swabs from individuals suspected of respiratory viral infection consistent with COVID-19 by their healthcare provider. Signs and symptoms of respiratory viral infection due to  SARS-CoV-2, influenza, and RSV can be similar.                                    Test performed using the Xpert Xpress SARS-CoV-2/FLU/RSV (real time RT-PCR)  assay on the [employer] instrument, [employer], [place] [address].                   This test is being used under the Food and Drug Administration's Emergency Use Authorization.                                    The authorized Fact Sheet for Healthcare Providers for this assay is available upon request from the laboratory.   CBC WITH DIFFERENTIAL WITH PLATELET    Narrative:     The following orders were created for panel order CBC With Differential With Platelet.                  Procedure                               Abnormality         Status                                     ---------                               -----------         ------                                     CBC W/ DIFFERENTIAL[[id number]]          Abnormal            Final result                                                 Please view results for these tests on the individual orders.       As Interpreted by me    Imaging Results Available and Reviewed while in ED: No results found.  ED Medications Administered:   Medications   ibuprofen (Motrin) tab 600 mg (600 mg Oral Given 1/23/24 1353)   acetaminophen (Tylenol Extra Strength) tab 1,000 mg (1,000 mg Oral Given 1/23/24 1353)         MDM     Vitals:    01/23/24 1238 01/23/24 1446   BP: 118/81 147/88   Pulse: 102 109   Resp: 18 18   Temp: 98.5 °F (36.9 °C)    TempSrc: Temporal    SpO2: 98% 98%   Weight: 69.5 kg      *I personally reviewed and interpreted all ED vitals.    Pulse Ox: 98%, Room air, Normal     Differential Diagnosis/ Diagnostic Considerations: Viral syndrome, dehydration, new onset diabetes, electrolyte abnormalities, DKA, other    Complicating Factors: The patient already has does not have a problem list on file. to contribute to the complexity of this ED evaluation.    Medical Decision Making  The patient presents to  the ED with likely viral symptoms.  Nondistressed on exam otherwise.  Glucose elevated but no DKA on laboratory workup and workup otherwise unremarkable.  Patient was given supportive care medications and felt improved following.  Stable for discharge home with outpatient follow-up.    Problems Addressed:  Viral syndrome: acute illness or injury    Amount and/or Complexity of Data Reviewed  Independent Historian: spouse     Details: The patient's spouse provides history details and translates for the patient at his request.  Labs: ordered. Decision-making details documented in ED Course.        Condition upon leaving the department: Stable    Disposition and Plan     Clinical Impression:  1. Viral syndrome        Disposition:  Discharge    Follow-up:  Santo Graham MD  [address] [place] [address]  [phone]    Schedule an appointment as soon as possible for a visit in 1 week(s)        Medications Prescribed:  Discharge Medication List as of 1/23/2024  2:34 PM

## 2024-01-24 NOTE — TELEPHONE ENCOUNTER
No prior auth needed   View all authorizations for this medication   Closed   1/23/2024  4:36 PM  Case ID: [id number] Close reason: Other   Note from payer: Your PA request has been closed. No PA required at this time. Please have the pharmacy process the request - TH,  01/23/2024 04:35 PM   Payer: [facility]    [phone]    [phone]

## 2024-01-25 ENCOUNTER — TELEPHONE (OUTPATIENT)
Dept: INTERNAL MEDICINE CLINIC | Facility: CLINIC | Age: 49
End: 2024-01-25

## 2024-01-25 ENCOUNTER — APPOINTMENT (OUTPATIENT)
Dept: CT IMAGING | Facility: HOSPITAL | Age: 49
End: 2024-01-25
Attending: NURSE PRACTITIONER
Payer: COMMERCIAL

## 2024-01-25 ENCOUNTER — TELEPHONE (OUTPATIENT)
Dept: SURGERY | Facility: CLINIC | Age: 49
End: 2024-01-25

## 2024-01-25 VITALS
TEMPERATURE: 99 F | DIASTOLIC BLOOD PRESSURE: 84 MMHG | WEIGHT: 144 LBS | BODY MASS INDEX: 23.99 KG/M2 | RESPIRATION RATE: 18 BRPM | HEART RATE: 75 BPM | OXYGEN SATURATION: 95 % | SYSTOLIC BLOOD PRESSURE: 138 MMHG | HEIGHT: 65 IN

## 2024-01-25 LAB
ADENOVIRUS PCR:: NOT DETECTED
B PARAPERT DNA SPEC QL NAA+PROBE: NOT DETECTED
B PERT DNA SPEC QL NAA+PROBE: NOT DETECTED
C PNEUM DNA SPEC QL NAA+PROBE: NOT DETECTED
CORONAVIRUS 229E PCR:: NOT DETECTED
CORONAVIRUS HKU1 PCR:: NOT DETECTED
CORONAVIRUS NL63 PCR:: NOT DETECTED
CORONAVIRUS OC43 PCR:: NOT DETECTED
FLUAV RNA SPEC QL NAA+PROBE: NOT DETECTED
FLUBV RNA SPEC QL NAA+PROBE: NOT DETECTED
GAD-65: <5 U/ML
METAPNEUMOVIRUS PCR:: NOT DETECTED
MYCOPLASMA PNEUMONIA PCR:: NOT DETECTED
PARAINFLUENZA 1 PCR:: NOT DETECTED
PARAINFLUENZA 2 PCR:: NOT DETECTED
PARAINFLUENZA 3 PCR:: NOT DETECTED
PARAINFLUENZA 4 PCR:: NOT DETECTED
RHINOVIRUS/ENTERO PCR:: NOT DETECTED
RSV RNA SPEC QL NAA+PROBE: NOT DETECTED
SARS-COV-2 RNA NPH QL NAA+NON-PROBE: NOT DETECTED

## 2024-01-25 PROCEDURE — 71260 CT THORAX DX C+: CPT | Performed by: NURSE PRACTITIONER

## 2024-01-25 PROCEDURE — 74177 CT ABD & PELVIS W/CONTRAST: CPT | Performed by: NURSE PRACTITIONER

## 2024-01-25 RX ORDER — ONDANSETRON 4 MG/1
4 TABLET, ORALLY DISINTEGRATING ORAL EVERY 4 HOURS PRN
Qty: 10 TABLET | Refills: 0 | Status: SHIPPED | OUTPATIENT
Start: 2024-01-25 | End: 2024-02-01

## 2024-01-25 RX ORDER — ACETAMINOPHEN 500 MG
1000 TABLET ORAL ONCE
Status: COMPLETED | OUTPATIENT
Start: 2024-01-25 | End: 2024-01-25

## 2024-01-25 RX ORDER — CEPHALEXIN 500 MG/1
500 CAPSULE ORAL 4 TIMES DAILY
Qty: 40 CAPSULE | Refills: 0 | Status: SHIPPED | OUTPATIENT
Start: 2024-01-25 | End: 2024-02-04

## 2024-01-25 RX ORDER — HYDROCODONE BITARTRATE AND ACETAMINOPHEN 7.5; 325 MG/1; MG/1
1-2 TABLET ORAL EVERY 6 HOURS PRN
Qty: 10 TABLET | Refills: 0 | Status: SHIPPED | OUTPATIENT
Start: 2024-01-25 | End: 2024-01-30

## 2024-01-25 RX ORDER — MORPHINE SULFATE 4 MG/ML
4 INJECTION, SOLUTION INTRAMUSCULAR; INTRAVENOUS ONCE
Status: COMPLETED | OUTPATIENT
Start: 2024-01-25 | End: 2024-01-25

## 2024-01-25 NOTE — TELEPHONE ENCOUNTER
Spoke with patient, I assisted in scheduling first available consult. Which is with  on Monday 1/29/24 at 4pm. I advised patient that if he were to have new or worsening symptoms he should contact his PCP or head to the emergency room. PT confirmed and verbalized understanding.     Future Appointments   Date Time Provider Department Center   1/29/2024 12:45 PM Skip Glasgow APRN [facility]   1/29/2024  4:00 PM Jimmie Macdonald MD [facility]   2/6/2024  2:30 PM Mahesh Alejandre, RN [facility]   2/24/2024  8:00 AM Santo Graham MD [facility]

## 2024-01-25 NOTE — TELEPHONE ENCOUNTER
Patient was contacted by diabetes navigator yasmani,   patient would like to cancel the appt. He stated he feels tired and weak , his brother also injects insulin and was able to show him how to use the insulin     Todays Appointment has been canceled

## 2024-01-25 NOTE — TELEPHONE ENCOUNTER
Used Interpretor line: danny [phone]  Left message for patient to call back.    Patient was just discharged from hospital 2:40am not sure if he will be coming in to appt today, offered to reschedule appt to Saturday as dr liriano has openings. Ask if patient his diabetes medicaitons/supplies to bring everything in for insulin administration teaching

## 2024-01-25 NOTE — TELEPHONE ENCOUNTER
Candelario Gomez pt was seen at [facility] ED for pyelonephritis, was advised to f/u with urology in 2 days, no openings until 2/2, requesting to speak to RN for sooner appt. Please advise thank you.

## 2024-01-25 NOTE — ED PROVIDER NOTES
Patient Seen in: [facility] Emergency Department      History     Chief Complaint   Patient presents with    Pain     Stated Complaint: diabetes/nausea, fever    Subjective:   49yo/m w newly diagnosis DM2, HLD reports with several days of cough, headache, fever, bodyaches. This is his 5th contact for the same. Seen at , arranged for same day IM establish care visit. Seen in  the next day, sent to ED for ?. Seen for viral symptoms in this ED yesterday. [place] [facility] did patient follow up call today and told him \"go to the ER because you feel so bad\". No chest pain. No dyspnea. No leg swelling. Vomited x 1 day. No back pain. No urinary symptoms. No rashes.             Objective:   Past Medical History:   Diagnosis Date    Diabetes (HCC)     Hyperlipidemia               No pertinent past surgical history.              No pertinent social history.            Review of Systems   Constitutional: Negative.    HENT: Negative.     Eyes: Negative.    Respiratory: Negative.     Cardiovascular: Negative.    Gastrointestinal: Negative.    Genitourinary: Negative.    Musculoskeletal: Negative.    Skin: Negative.    Neurological: Negative.    Psychiatric/Behavioral: Negative.         Positive for stated complaint: diabetes/nausea, fever  Other systems are as noted in HPI.  Constitutional and vital signs reviewed.      All other systems reviewed and negative except as noted above.    Physical Exam     ED Triage Vitals [01/24/24 2226]   /79   Pulse 108   Resp 20   Temp 99 °F (37.2 °C)   Temp src Oral   SpO2 99 %   O2 Device None (Room air)       Current:/79   Pulse 108   Temp 99 °F (37.2 °C) (Oral)   Resp 20   Ht 165.1 cm (5' 5\")   Wt 65.3 kg   SpO2 99%   BMI 23.96 kg/m²         Physical Exam  Vitals and nursing note reviewed.   Constitutional:       General: He is not in acute distress.     Appearance: He is well-developed.   HENT:      Head: Normocephalic and atraumatic.      Nose: Nose normal.       Mouth/Throat:      Mouth: Mucous membranes are moist.   Eyes:      Conjunctiva/sclera: Conjunctivae normal.      Pupils: Pupils are equal, round, and reactive to light.   Cardiovascular:      Rate and Rhythm: Normal rate and regular rhythm.      Heart sounds: Normal heart sounds.   Pulmonary:      Effort: Pulmonary effort is normal.      Breath sounds: Normal breath sounds.   Abdominal:      General: Bowel sounds are normal.      Palpations: Abdomen is soft.   Musculoskeletal:         General: No tenderness or deformity. Normal range of motion.      Cervical back: Normal range of motion and neck supple.   Skin:     General: Skin is warm and dry.      Capillary Refill: Capillary refill takes less than 2 seconds.      Findings: No rash.      Comments: Normal color   Neurological:      General: No focal deficit present.      Mental Status: He is alert and oriented to person, place, and time.      GCS: GCS eye subscore is 4. GCS verbal subscore is 5. GCS motor subscore is 6.      Cranial Nerves: No cranial nerve deficit.      Gait: Gait normal.           ED Course     Labs Reviewed   COMP METABOLIC PANEL (14) - Abnormal; Notable for the following components:       Result Value    Glucose 278 (*)     Sodium 128 (*)     Chloride 96 (*)     Alkaline Phosphatase 124 (*)     All other components within normal limits   URINALYSIS, ROUTINE - Abnormal; Notable for the following components:    Spec Gravity >1.030 (*)     Glucose Urine >1000 (*)     Ketones Urine 60 (*)     Blood Urine 2+ (*)     Protein Urine 70 (*)     Leukocyte Esterase Urine 75 (*)     WBC Urine 21-50 (*)     RBC Urine 3-5 (*)     All other components within normal limits   D-DIMER - Abnormal; Notable for the following components:    D-Dimer 0.50 (*)     All other components within normal limits   POCT GLUCOSE - Abnormal; Notable for the following components:    POC Glucose  270 (*)     All other components within normal limits   CBC W/ DIFFERENTIAL -  Abnormal; Notable for the following components:    WBC 11.8 (*)     HGB 12.9 (*)     HCT 35.7 (*)     RDW-SD 33.9 (*)     Neutrophil Absolute Prelim 9.68 (*)     Neutrophil Absolute 9.68 (*)     All other components within normal limits   LIPASE - Normal   RESPIRATORY FLU EXPAND PANEL + COVID-19 - Normal    Narrative:     This test is intended for the simultaneous qualitative detection and differentiation of nucleic acids from multiple viral and bacterial respiratory organisms, including nucleic acid from Severe Acute Respiratory Syndrome Coronavirus 2 (SARS-CoV-2) in nasopharyngeal swab from individuals suspected of respiratory viral infection consistent with COVID-19 by their healthcare provider.    Test performed using the ExpertFile Respiratory Panel 2.1 (RP2.1) assay on the [employer] System, [employer], [place], [place] [address].    This test is being used under the Food and Drug Administration's Emergency Use Authorization.    The authorized Fact Sheet for Healthcare Providers for this assay is available upon request from the laboratory.    SARS and MERS coronaviruses are not tested on this assay.   CBC WITH DIFFERENTIAL WITH PLATELET    Narrative:     The following orders were created for panel order CBC With Differential With Platelet.  Procedure                               Abnormality         Status                     ---------                               -----------         ------                     CBC W/ DIFFERENTIAL[[id number]]          Abnormal            Final result                 Please view results for these tests on the individual orders.        IMPRESSION:  CT CHEST PULMONARY ANGIOGRAM:  -No evidence of pulmonary embolism. Technically adequate contrast opacification of the pulmonary arteries.    -No focal consolidation. No pleural effusions.  -No thoracic aortic aneurysm.      CT ABDOMEN PELVIS:  -Heterogeneous enhancement of the kidneys, left worse than right, is suspicious  for pyelonephritis.  No obstructing radiopaque stones or evidence of an obstructive uropathy.  -No aortic aneurysm.  -Normal appendix.  -No evidence of bowel obstruction.           MDM           Medical Decision Making  47yo/m w hx and exam as stated, bodyaches, back pain, headache    Non toxic, stable appearing  No point tenderness  Vomiting at home  Tolerating po  No dysuria  Urine ?uti  Dyspnea yesterday  Pos dimer  Neg ctpe  Ct abd bilateral pyelonephritis    Plan  Rocephin  Culture  Keflex  Close fu      Amount and/or Complexity of Data Reviewed  Labs:  Decision-making details documented in ED Course.  Radiology:  Decision-making details documented in ED Course.    Risk  OTC drugs.  Prescription drug management.        Disposition and Plan     Clinical Impression:  1. Pyelonephritis         Disposition:  Discharge  1/25/2024  2:31 am    Follow-up:  Telly Correa MD  [address] [place] [id number]  [phone]    Follow up in 2 day(s)            Medications Prescribed:  Current Discharge Medication List        START taking these medications    Details   cephalexin 500 MG Oral Cap Take 1 capsule (500 mg total) by mouth 4 (four) times daily for 10 days.  Qty: 40 capsule, Refills: 0      HYDROcodone-acetaminophen 7.5-325 MG Oral Tab Take 1-2 tablets by mouth every 6 (six) hours as needed for Pain.  Qty: 10 tablet, Refills: 0    Associated Diagnoses: Pyelonephritis      ondansetron 4 MG Oral Tablet Dispersible Take 1 tablet (4 mg total) by mouth every 4 (four) hours as needed for Nausea.  Qty: 10 tablet, Refills: 0    Associated Diagnoses: Pyelonephritis

## 2024-01-25 NOTE — ED INITIAL ASSESSMENT (HPI)
Pt presents to the ED with c/o generalized body aches w/ nausea and decreased po intake for a few days. Pt diagnosed with a viral syndrome and discharged from this ER. Pt reports symptoms are worsening. History of DM.

## 2024-01-29 ENCOUNTER — OFFICE VISIT (OUTPATIENT)
Dept: SURGERY | Facility: CLINIC | Age: 49
End: 2024-01-29

## 2024-01-29 ENCOUNTER — OFFICE VISIT (OUTPATIENT)
Dept: ENDOCRINOLOGY CLINIC | Facility: CLINIC | Age: 49
End: 2024-01-29

## 2024-01-29 VITALS
WEIGHT: 141 LBS | SYSTOLIC BLOOD PRESSURE: 123 MMHG | BODY MASS INDEX: 23 KG/M2 | DIASTOLIC BLOOD PRESSURE: 90 MMHG | HEART RATE: 101 BPM

## 2024-01-29 VITALS
SYSTOLIC BLOOD PRESSURE: 125 MMHG | WEIGHT: 141 LBS | HEIGHT: 65 IN | DIASTOLIC BLOOD PRESSURE: 87 MMHG | HEART RATE: 93 BPM | BODY MASS INDEX: 23.49 KG/M2

## 2024-01-29 DIAGNOSIS — R31.29 MICROHEMATURIA: ICD-10-CM

## 2024-01-29 DIAGNOSIS — E11.65 UNCONTROLLED TYPE 2 DIABETES MELLITUS WITH HYPERGLYCEMIA (HCC): Primary | ICD-10-CM

## 2024-01-29 DIAGNOSIS — K40.90 LEFT INGUINAL HERNIA: ICD-10-CM

## 2024-01-29 DIAGNOSIS — N12 PYELONEPHRITIS: Primary | ICD-10-CM

## 2024-01-29 LAB
GLUCOSE BLOOD: 341
TEST STRIP LOT #: NORMAL NUMERIC

## 2024-01-29 PROCEDURE — 99204 OFFICE O/P NEW MOD 45 MIN: CPT | Performed by: UROLOGY

## 2024-01-29 PROCEDURE — 3074F SYST BP LT 130 MM HG: CPT | Performed by: UROLOGY

## 2024-01-29 PROCEDURE — 99204 OFFICE O/P NEW MOD 45 MIN: CPT | Performed by: NURSE PRACTITIONER

## 2024-01-29 PROCEDURE — 3074F SYST BP LT 130 MM HG: CPT | Performed by: NURSE PRACTITIONER

## 2024-01-29 PROCEDURE — 3008F BODY MASS INDEX DOCD: CPT | Performed by: UROLOGY

## 2024-01-29 PROCEDURE — 3079F DIAST BP 80-89 MM HG: CPT | Performed by: UROLOGY

## 2024-01-29 PROCEDURE — 82947 ASSAY GLUCOSE BLOOD QUANT: CPT | Performed by: NURSE PRACTITIONER

## 2024-01-29 PROCEDURE — 3080F DIAST BP >= 90 MM HG: CPT | Performed by: NURSE PRACTITIONER

## 2024-01-29 RX ORDER — ACYCLOVIR 400 MG/1
1 TABLET ORAL
Qty: 9 EACH | Refills: 0 | Status: SHIPPED | OUTPATIENT
Start: 2024-01-29

## 2024-01-29 NOTE — PATIENT INSTRUCTIONS
A1C: 12,7% el 22/01/2024  Glucosa en josefina: 341 en la clínica hoy    Medicamentos:  - aumentar Tresiba (pluma ja oscuro) 10 --> 16 unidades diarias a las 12 p.m.  - empezar a colleen Metformina 500 mg con el desayuno     500 mg con la garland  - continuar con Ozempic 0,25 mg rivera vez a la semana judi 3 semanas  - semana 4 (el 19/2) aumente Ozempic a 0,5 mg por semana    -Trabajemos para limitar los carbohidratos a 60 g por comida/valentin 180 g por día.  -evitar comer snacks entre comidas  -Evite comer dulces, refrescos/jugos de frutas, Gatorade o bebidas energéticas.  -comenzar a cenar a las 6:30 p.m.    - por favor envíeme rivera actualización sobre allison lecturas de glucosa en josefina dentro de 4 semanas      Peso:  Lecturas de peso de los últimos 6 encuentros:  29/01/24 141 libras (64 kg)  24/01/24 144 libras (65,3 kg)  23/01/24 153 libras 3,5 onzas (69,5 kg)    Objetivo A1C:  <7,0%    Pruebas de azúcar en josefina:  Comience a utilizar el glucómetro continuo Dexcom G7    Objetivos de azúcar en josefina:  Antes del desayuno:  (preferiblemente < 110)  2 horas después de las comidas: <180 (preferiblemente <150)    Llame para niveles de azúcar en josefina persistentes < 75 o > 200

## 2024-01-29 NOTE — PROGRESS NOTES
: Ita  ID# [id number]            Stephan        [id number]    Name: Dawson Ibarra  Date: 2024    Referring Physician: No ref. provider found    CHIEF COMPLAINT   Chief Complaint   Patient presents with    Consult     Pt is a hospital fu      HISTORY OF PRESENT ILLNESS   Dawson Ibarra is a 48 year old male who presents for new consultation on diabetes management.   HbA1C: 12.7% on 2024.   Blood glucose is: 341 in clinic today.     FAMILY HISTORY OF DIABETES  -mother and father and brother   DIABETES HISTORY  Diagnosed: around 8 days ago   Prior HbA, C or glycohemoglobin were 12.7% on 2024;     Patient has not had hospitalizations for blood sugar issues.    Denies any history of pancreatitis.     PREVIOUS MEDICATION FOR DM:  None     CURRENT MEDICATIONS FOR DM:  - Tresiba 10 units daily at 12pm   - Metformin 1,000mg twice daily - has been taking 1 tablet with dinner   - Ozempic 0.25mg once weekly - took 1st week on ; has not taken yet today due to confusion on which mediations to take     HOME GLUCOSE READINGS:   Testing BG x 1 daily  Meter or log book today: no  Fastin, 254, 246,   Before lunch: 321, 287, 277,   1hr after dinner: 269,   Bedtime: 350   Hypoglycemia present: no     HISTORY OF DIABETES COMPLICATIONS:  History of Retinopathy: denies - last eye exam within the last 12 months: no  History of Neuropathy: no   History of Nephropathy: no     ASSOCIATED COMPLICATIONS:   HTN: no   Hyperlipidemia: yes   Cardiovascular Disease: no    Peripheral Vascular Disease: no     DIETARY COMPLIANCE:  Eating around 3 meals per day   B around 9:40am    -Mexican cuisine - usually eating take out foods--  tacos or pizza; drinking coffee   Snack around 10am:    - apple or banana - whole fruit   L around 1:30pm:   - chicken soup or meat (chorizo) with beans with rice or pasta or potatoes; drinking regular soda (can)  D around 7pm   - same as lunch   HS Snack:    - cereal (half of  bowl) with milk (around 1 cup) with spoonful of sugar or apple or mexican bread (1 individual size)    + regular soda with lunch daily while at work     EXERCISE:   No     Polyuria, polyphagia, polydipsia: no   Paresthesias: no   Blurred vision: yes   Recent steroids, illness or infections: no     REVIEW OF SYSTEMS  Constitutional: Negative for: weight change, fever, fatigue, cold/heat intolerance  Eyes: Negative for:  Visual changes, proptosis, blurring  ENT: Negative for:  dysphagia, neck swelling, dysphonia  Respiratory: Negative for: hemoptysis, shortness of breath, cough, or dyspnea.  Cardiovascular: Negative for:  chest pain, chest discomfort, palpitations  GI: Negative for:  abdominal pain, nausea, vomiting, diarrhea, heartburn, constipation  Neurology: Negative for: headache, dizziness, syncope, numbness/tingling, or weakness.   Genito-Urinary: Negative for: dysuria, frequency or hematuria   Hematology/Lymphatics: Negative for: bruising, easy bleeding, lower extremity edema  Skin: Negative for: rash, blister, infection or ulcers.  Endocrine: Negative for: polyuria, polydipsia. no osteoporosis. no thyroid disease.     MEDICATIONS:     Current Outpatient Medications:     cephalexin 500 MG Oral Cap, Take 1 capsule (500 mg total) by mouth 4 (four) times daily for 10 days., Disp: 40 capsule, Rfl: 0    HYDROcodone-acetaminophen 7.5-325 MG Oral Tab, Take 1-2 tablets by mouth every 6 (six) hours as needed for Pain., Disp: 10 tablet, Rfl: 0    ondansetron 4 MG Oral Tablet Dispersible, Take 1 tablet (4 mg total) by mouth every 4 (four) hours as needed for Nausea., Disp: 10 tablet, Rfl: 0    Blood Glucose Monitoring Suppl (ONETOUCH VERIO) w/Device Does not apply Kit, 1 strip by In Vitro route in the morning and 1 strip before bedtime., Disp: 1 kit, Rfl: 0    Glucose Blood (ONETOUCH VERIO) In Vitro Strip, 1 strip by In Vitro route in the morning and 1 strip before bedtime., Disp: 200 strip, Rfl: 0    OneTouch Delica  Lancets 33G Does not apply Misc, Inject 1 Lancet as directed in the morning and 1 Lancet before bedtime., Disp: 200 each, Rfl: 2    Insulin Degludec (TRESIBA) 100 UNIT/ML Subcutaneous Solution, Inject 10 Units into the skin daily., Disp: 1 each, Rfl: 1    Insulin Pen Needle 33G X 4 MM Does not apply Misc, 1 Application daily., Disp: 100 each, Rfl: 3    metFORMIN 500 MG Oral Tab, Take 1 tablet (500 mg total) by mouth 2 (two) times daily with meals for 30 days, THEN 2 tablets (1,000 mg total) 2 (two) times daily with meals., Disp: 300 tablet, Rfl: 0    Insulin Pen Needle (PEN NEEDLES) 32G X 4 MM Does not apply Misc, 1 each 4 (four) times daily before meals and nightly., Disp: 400 each, Rfl: 9    rosuvastatin 20 MG Oral Tab, Take 1 tablet (20 mg total) by mouth nightly. FOR CHOLESTEROL., Disp: 90 tablet, Rfl: 3    semaglutide (OZEMPIC, 0.25 OR 0.5 MG/DOSE,) 2 MG/3ML Subcutaneous Solution Pen-injector, Inject 0.25 mg into the skin once a week for 30 days, THEN 0.5 mg once a week for 14 days. SEE ME AFTER WEEK 4 FOR REFILLS/ADJUSTMENT. NO REFILLS ON THIS RX.. (Patient not taking: Reported on 1/29/2024), Disp: 3 mL, Rfl: 0    ALLERGIES:   No Known Allergies    SOCIAL HISTORY:   Social History     Socioeconomic History    Marital status:    Tobacco Use    Smoking status: Never    Smokeless tobacco: Never   Vaping Use    Vaping Use: Never used   Substance and Sexual Activity    Alcohol use: Yes     Comment: social    Drug use: Never       PAST MEDICAL HISTORY:   Past Medical History:   Diagnosis Date    Diabetes (HCC)     Hyperlipidemia        PAST SURGICAL HISTORY:   Past Surgical History:   Procedure Laterality Date    HERNIA SURGERY      10 years ago       PHYSICAL EXAM:   Vitals:    01/29/24 1249   BP: 123/90   Pulse: 101   Weight: 141 lb (64 kg)     BMI:   Body mass index is 23.46 kg/m².    General Appearance:  alert, well developed, in no acute distress  Nutritional:  no extreme weight gain or loss  Head:  Atraumatic  Eyes:  normal conjunctivae, sclera., normal sclera and normal pupils  Throat/Neck: normal sound to voice. Normal hearing, normal speech  Back: no kyphosis  Respiratory:  Speaking in full sentences, non-labored. no increased work of breathing, no audible wheezing    Skin:  normal moisture and skin texture, no visible lesions  Hair and nails: normal scalp hair  Hematologic:  no excessive bruising  Neuro: motor grossly intact, moving all extremities without difficulty  Psychiatric:  oriented to time, self, and place  Extremities: no obvious extremity swelling, no lesions    LABS: Pertinent labs reviewed    ASSESSMENT/PLAN:    -Reviewed with patient the pathogenesis of diabetes, clinical significance of A1c, and common complications such as: microvascular, macrovascular and diabetic ketoacidosis. Patient verbalizes understanding of the importance of glycemic control and the goals of therapy.   -Discussed with patient glucose targets ranges (Fasting  and post prandial <180).     1.Type 2 Diabetes Mellitus, uncontrolled, with hyperglycemia   -LAB DATA  HbA,C: 12.7% on 1/22/2024  a) Medications  - increase Tresiba 10--> 16 units daily - Risks and benefits reviewed. Verbalizes understanding.  - increase Metformin to 500mg twice daily - Risks and benefits reviewed. Verbalizes understanding.  - continue with Ozempic 0.25mg weekly for an additional 3 weeks, then increase to 0.5mg weekly - Risks and benefits reviewed. Verbalizes understanding.    -Had lengthy discussion regarding poor glycemic control and increased risk for health complications.   -reviewed health complications in detail and discussed importance of better glucose control to prevent onset /progression of DM health complications.    - recommended that patient keeps apt with [facility] to review low carb diet. Patient notes that he is currently out of a job and would not be able to pay another co-pay for an apt. He notes that his daughter has  pre-diabetes and recently underwent diet education. He plans to follow her instructions.   - discussed that he stops eating snacks between meals  - discussed that he eliminates regular soda and sweets. Ok to drink diet soda.   - discussed that he starts to eat dinner by 6:30pm.   - reviewed benefits of CGM technology. Patient is interested in starting Dexcom G7 CGM. He has been educated on cgm use in clinic today. Rx sent to pharmacy and sample started in clinic today.    -reviewed target goal BG readings and A1C  -reviewed when to call and notify me of abnormal BG readings.   - discussed that he gives me an update on his glucose readings in 4 weeks, sooner if he has readings in 80s or less.     b) Nephropathy: GFR: 81 on 2024 and urine MA: 46.5 on 2024   c) Discussed importance of annual eye exams. Referral for optho entered.   d) Foot exam: will defer to next f/u visit   e) start using Dexcom G7 cgm. Sample was started in clinic today.   f) Life style changes reviewed today.     2. Blood Pressure Management   -normotensive today     3.Hyperlipidemia   - on rosuvastatin 20mg at bedtime  - LDL: 96 and Tri on 2024  - LDL goal per ADA guidelines <70      RTC in 2 months   Patient instructed to call sooner if they develop Blood glucose readings <75 and/or if they have readings persistently >200.     The risks and benefits of my recommendations, as well as other treatment options were discussed with the patient today. questions were also answered to the best of my knowledge. Patient verbalizes understanding of these issues and agrees to the plan.    2024  ITA Cadena

## 2024-01-29 NOTE — PROGRESS NOTES
[facility]  Initial Office Consultation    HPI:   Dawson Ibarra is a 48 year old male here today for consultation at the request of, and a copy of this note will be sent to, Santo Graham MD. Accompanied by his brother.  Language line  utilized for this visit with  ID [id number] (Morteza).    1.  Pyelonephritis  2.  Microscopic hematuria  Patient with recently diagnosed type II DM January 2024.  His hemoglobin A1c is about 12.7%.    He has been to the emergency room and urgent care clinic about 3 times for further evaluation of generalized fatigue, tiredness, fevers, upper respiratory symptoms, vomiting and bodyaches.    He was diagnosed with diabetes during 1 of those visits where he was complaining of polyuria and polydipsia.  CT abdomen pelvis with IV contrast 1/25/2024 revealing findings that raise suspicion for left greater than right ascending UTI/pyelonephritis.    His urinalysis from 1/24/2024 revealed 1+ leuks, no nitrites, 2+ blood, 21-50 WBCs, 3-5 RBCs, and no bacteria.  This did not reflex to a culture.    Treated with Keflex 4 times daily for 10 days.  He reports improvement in his symptoms.    He has also recently been started on treatment for his diabetes.    He denies gross hematuria, history of nephrolithiasis, recurrent UTIs.    No significant voiding symptoms.  His IPSS is 10 (0/2/0/2/2/0/4) with a quality-of-life score of 3.    - 1/2024 consult: PVR 0 mL.      PAST MEDICAL HISTORY: DM.  HLD.    PAST SURGICAL HISTORY: Bilateral inguinal hernia repair.    SOCIAL HISTORY:  and has 3 children.  No smoking or illicit drug use.  Social alcohol.  He is a metal .     Family History   Problem Relation Age of Onset    Diabetes Mother     Diabetes Father     Other (infection of foot) Father     Diabetes Sister     Diabetes Brother      Allergies: Patient has no known allergies.      REVIEW OF SYSTEMS:  Pertinent positives and  negatives per HPI. A 12-point ROS was performed and is otherwise negative.       EXAM:  /87 (BP Location: Left arm, Patient Position: Sitting, Cuff Size: adult)   Pulse 93   Ht 5' 5\" (1.651 m)   Wt 141 lb (64 kg)   BMI 23.46 kg/m²     Physical Exam  Constitutional:       Appearance: He is well-developed.   HENT:      Head: Normocephalic.   Eyes:      General: No scleral icterus.  Cardiovascular:      Rate and Rhythm: Normal rate.   Pulmonary:      Effort: Pulmonary effort is normal.   Abdominal:      General: There is no distension.      Palpations: Abdomen is soft.      Tenderness: There is no abdominal tenderness.      Hernia: A hernia is present. Hernia is present in the left inguinal area (reducible).   Genitourinary:     Penis: Uncircumcised. No phimosis.       Testes: Normal.      Epididymis:      Right: Normal.      Left: Normal.   Skin:     General: Skin is warm and dry.   Neurological:      Mental Status: He is alert and oriented to person, place, and time.   Psychiatric:         Mood and Affect: Mood normal.         Behavior: Behavior normal.       LABS:  See HPI for details.      IMAGING:    CT ABDOMEN PELVIS IV CONTRAST, NO ORAL (1/24/2024): 1. Multifocal striations of the left greater than right renal nephrograms.  There is left perinephric inflammatory stranding.  Findings raise suspicion for left greater than right ascending urinary tract infection/pyelonephritis.  Urinalysis correlation is requested. 2. Fatty liver. 3. Colonic diverticulosis. 4. Right inguinal herniorrhaphy with no significant hernia recurrence.  Moderate fat containing umbilical hernia. 5. Coronary and peripheral atherosclerosis. 6. Triangular noncalcified 7-8 mm subpleural nodule in the left lower lobe.  Please refer to follow-up recommendations from same date chest CT. 7. Lesser incidental findings as above.   A preliminary report was issued by the [facility] teleradiology service. There are no major  discrepancies.      IMPRESSION:  48 year old male with newly diagnosed type II DM.  Found to have radiographic findings suggestive of pyelonephritis upon ER evaluation of generalized fatigue, body aches, fevers, nausea.    Urinalysis without clear signs of UTI.  Did not reflex to a culture.    Mild microscopic hematuria noted on recent UA.    Findings reviewed with patient and accompanying brother at length.  We discussed relevant anatomy and physiology.    Discussed that diabetes is my opinion the main risk factor in him developing pyelonephritis.  Discussed the importance of strict diabetic control at this point and preventing further recurrent episodes.    We discussed the finding of microscopic hematuria on his recent UA.  Differential diagnoses discussed.  Discussed recommendation for further evaluation with a cystoscopy.  Patient would like to repeat another UA in 4 weeks before finalizing decision.    He also has what seems to be a recurrent left inguinal hernia.  Discussed referral to general surgery to discuss further management.  He is agreeable.    All questions answered.      PLAN:  1.  Strict diabetic control to reduce risk of future recurrent pyelonephritis.    2.  Patient will follow-up in 4 to 6 weeks with a urinalysis submitted a few days before the office visit.  He will consider proceeding with recommended cystoscopy based on those results.    3.  Referral to general surgery to discuss further management of recurrent left inguinal hernia.      Jimmie Macdonald MD  1/29/2024

## 2024-01-31 LAB — INSULIN ABS: <5 UU/ML

## 2024-02-06 ENCOUNTER — APPOINTMENT (OUTPATIENT)
Dept: ENDOCRINOLOGY | Age: 49
End: 2024-02-06
Attending: STUDENT IN AN ORGANIZED HEALTH CARE EDUCATION/TRAINING PROGRAM
Payer: COMMERCIAL

## 2024-02-07 ENCOUNTER — HOSPITAL ENCOUNTER (OUTPATIENT)
Dept: ENDOCRINOLOGY | Age: 49
Discharge: HOME | End: 2024-02-07
Attending: STUDENT IN AN ORGANIZED HEALTH CARE EDUCATION/TRAINING PROGRAM
Payer: COMMERCIAL

## 2024-02-07 NOTE — PROGRESS NOTES
General: Met with patient to review and sign the financial hardship application. Per patient he has been out work due to health issues. Patient also requested assistance with disability forms. I emailed forms to the forms department on 2/1/24. Patient continues to comply with current medications as directed. No side effects or concerns. Patient states he will need a refill on metformin 500mg. Message will be sent to endocrinology. I encourage patient to call with any questions or concerns, patient verbalized understanding.     Follow up appointments:   PCP: 02/24/24 8:00am Dr. Graham  Endocrinology: 3/11/24 12:45pm Skip Glasgow   Urology: 3/11/24 4:00 pm Dr. Macdonald

## 2024-02-10 ENCOUNTER — TELEPHONE (OUTPATIENT)
Dept: ADMINISTRATIVE | Age: 49
End: 2024-02-10

## 2024-02-10 NOTE — TELEPHONE ENCOUNTER
Rcvd Disab forms from [facility], no valid JACKIE/ Auth for Dr Graham in Forms Dept. Patient does not have Mychart so we will call patient w/ [facility] interp to get JACKIE/Auth . Logged for processing

## 2024-02-10 NOTE — TELEPHONE ENCOUNTER
Rcvd Disab forms from [facility], no valid JACKIE/ Auth for Dr Graham in Forms Dept. My chart message sent for auth. Logged for processing

## 2024-02-20 NOTE — TELEPHONE ENCOUNTER
Used  line to contact patient. Spoke with Rufino [phone].    Type of Leave: Disability  Reason for Leave: high bp and high sugar  Start date of leave: patient will call back with exact dates of leave.   How much time needed?:   Forms Due Date:  Was Fee and Turnaround info Given?:      **PLEASE REMIND PATIENT THAT AN JACKIE IS NEEDED AS WELL**

## 2024-02-22 NOTE — TELEPHONE ENCOUNTER
Pt called back to give exact dates of leave:  Start: 1/22/24 - end: 2/4/24, rtw 2/5/24 no restrictions. Pt requested a call back once forms are completed to  forms at MDs office.

## 2024-02-24 ENCOUNTER — LAB ENCOUNTER (OUTPATIENT)
Dept: LAB | Age: 49
End: 2024-02-24
Attending: STUDENT IN AN ORGANIZED HEALTH CARE EDUCATION/TRAINING PROGRAM
Payer: COMMERCIAL

## 2024-02-24 ENCOUNTER — OFFICE VISIT (OUTPATIENT)
Dept: INTERNAL MEDICINE CLINIC | Facility: CLINIC | Age: 49
End: 2024-02-24

## 2024-02-24 VITALS
HEART RATE: 75 BPM | SYSTOLIC BLOOD PRESSURE: 119 MMHG | BODY MASS INDEX: 23.16 KG/M2 | WEIGHT: 139 LBS | DIASTOLIC BLOOD PRESSURE: 73 MMHG | HEIGHT: 65 IN

## 2024-02-24 DIAGNOSIS — D50.9 IRON DEFICIENCY ANEMIA, UNSPECIFIED IRON DEFICIENCY ANEMIA TYPE: ICD-10-CM

## 2024-02-24 DIAGNOSIS — E78.5 DYSLIPIDEMIA: ICD-10-CM

## 2024-02-24 DIAGNOSIS — E55.9 VITAMIN D DEFICIENCY: ICD-10-CM

## 2024-02-24 DIAGNOSIS — E11.69 TYPE 2 DIABETES MELLITUS WITH OTHER SPECIFIED COMPLICATION, UNSPECIFIED WHETHER LONG TERM INSULIN USE (HCC): ICD-10-CM

## 2024-02-24 DIAGNOSIS — Z00.00 ANNUAL PHYSICAL EXAM: Primary | ICD-10-CM

## 2024-02-24 DIAGNOSIS — Z00.00 ANNUAL PHYSICAL EXAM: ICD-10-CM

## 2024-02-24 DIAGNOSIS — Z12.11 ENCOUNTER FOR SCREENING COLONOSCOPY: ICD-10-CM

## 2024-02-24 LAB
ALBUMIN SERPL-MCNC: 4.7 G/DL (ref 3.2–4.8)
ALBUMIN/GLOB SERPL: 1.3 {RATIO} (ref 1–2)
ALP LIVER SERPL-CCNC: 59 U/L
ALT SERPL-CCNC: 30 U/L
ANION GAP SERPL CALC-SCNC: 7 MMOL/L (ref 0–18)
AST SERPL-CCNC: 23 U/L (ref ?–34)
BASOPHILS # BLD AUTO: 0.04 X10(3) UL (ref 0–0.2)
BASOPHILS NFR BLD AUTO: 0.6 %
BILIRUB SERPL-MCNC: 0.3 MG/DL (ref 0.3–1.2)
BUN BLD-MCNC: 13 MG/DL (ref 9–23)
BUN/CREAT SERPL: 13.1 (ref 10–20)
CALCIUM BLD-MCNC: 10.1 MG/DL (ref 8.7–10.4)
CARTRIDGE EXPIRATION DATE: ABNORMAL DATE
CARTRIDGE LOT#: ABNORMAL NUMERIC
CHLORIDE SERPL-SCNC: 106 MMOL/L (ref 98–112)
CHOLEST SERPL-MCNC: 97 MG/DL (ref ?–200)
CO2 SERPL-SCNC: 28 MMOL/L (ref 21–32)
CREAT BLD-MCNC: 0.99 MG/DL
DEPRECATED HBV CORE AB SER IA-ACNC: 54.7 NG/ML
DEPRECATED RDW RBC AUTO: 37.2 FL (ref 35.1–46.3)
EGFRCR SERPLBLD CKD-EPI 2021: 94 ML/MIN/1.73M2 (ref 60–?)
EOSINOPHIL # BLD AUTO: 0.18 X10(3) UL (ref 0–0.7)
EOSINOPHIL NFR BLD AUTO: 2.9 %
ERYTHROCYTE [DISTWIDTH] IN BLOOD BY AUTOMATED COUNT: 11.8 % (ref 11–15)
FASTING PATIENT LIPID ANSWER: YES
FASTING STATUS PATIENT QL REPORTED: YES
GLOBULIN PLAS-MCNC: 3.5 G/DL (ref 2.8–4.4)
GLUCOSE BLD-MCNC: 115 MG/DL (ref 70–99)
HCT VFR BLD AUTO: 43 %
HDLC SERPL-MCNC: 29 MG/DL (ref 40–59)
HEMOGLOBIN A1C: 9.1 % (ref 4.3–5.6)
HGB BLD-MCNC: 15.2 G/DL
IMM GRANULOCYTES # BLD AUTO: 0.01 X10(3) UL (ref 0–1)
IMM GRANULOCYTES NFR BLD: 0.2 %
IRON SATN MFR SERPL: 18 %
IRON SERPL-MCNC: 57 UG/DL
LDLC SERPL CALC-MCNC: 51 MG/DL (ref ?–100)
LYMPHOCYTES # BLD AUTO: 1.65 X10(3) UL (ref 1–4)
LYMPHOCYTES NFR BLD AUTO: 26.3 %
MCH RBC QN AUTO: 30.6 PG (ref 26–34)
MCHC RBC AUTO-ENTMCNC: 35.3 G/DL (ref 31–37)
MCV RBC AUTO: 86.5 FL
MONOCYTES # BLD AUTO: 0.52 X10(3) UL (ref 0.1–1)
MONOCYTES NFR BLD AUTO: 8.3 %
NEUTROPHILS # BLD AUTO: 3.87 X10 (3) UL (ref 1.5–7.7)
NEUTROPHILS # BLD AUTO: 3.87 X10(3) UL (ref 1.5–7.7)
NEUTROPHILS NFR BLD AUTO: 61.7 %
NONHDLC SERPL-MCNC: 68 MG/DL (ref ?–130)
OSMOLALITY SERPL CALC.SUM OF ELEC: 293 MOSM/KG (ref 275–295)
PLATELET # BLD AUTO: 138 10(3)UL (ref 150–450)
POTASSIUM SERPL-SCNC: 5 MMOL/L (ref 3.5–5.1)
PROT SERPL-MCNC: 8.2 G/DL (ref 5.7–8.2)
PSA SERPL-MCNC: 3.8 NG/ML (ref ?–4)
RBC # BLD AUTO: 4.97 X10(6)UL
SODIUM SERPL-SCNC: 141 MMOL/L (ref 136–145)
TIBC SERPL-MCNC: 317 UG/DL (ref 250–425)
TRANSFERRIN SERPL-MCNC: 213 MG/DL (ref 215–365)
TRIGL SERPL-MCNC: 82 MG/DL (ref 30–149)
TSI SER-ACNC: 2.11 MIU/ML (ref 0.55–4.78)
VIT D+METAB SERPL-MCNC: 18.3 NG/ML (ref 30–100)
VLDLC SERPL CALC-MCNC: 12 MG/DL (ref 0–30)
WBC # BLD AUTO: 6.3 X10(3) UL (ref 4–11)

## 2024-02-24 PROCEDURE — 80053 COMPREHEN METABOLIC PANEL: CPT

## 2024-02-24 PROCEDURE — 80061 LIPID PANEL: CPT

## 2024-02-24 PROCEDURE — 99396 PREV VISIT EST AGE 40-64: CPT | Performed by: STUDENT IN AN ORGANIZED HEALTH CARE EDUCATION/TRAINING PROGRAM

## 2024-02-24 PROCEDURE — 84153 ASSAY OF PSA TOTAL: CPT

## 2024-02-24 PROCEDURE — 83036 HEMOGLOBIN GLYCOSYLATED A1C: CPT | Performed by: STUDENT IN AN ORGANIZED HEALTH CARE EDUCATION/TRAINING PROGRAM

## 2024-02-24 PROCEDURE — 82306 VITAMIN D 25 HYDROXY: CPT

## 2024-02-24 PROCEDURE — 84466 ASSAY OF TRANSFERRIN: CPT

## 2024-02-24 PROCEDURE — 83540 ASSAY OF IRON: CPT

## 2024-02-24 PROCEDURE — 36415 COLL VENOUS BLD VENIPUNCTURE: CPT

## 2024-02-24 PROCEDURE — 82728 ASSAY OF FERRITIN: CPT

## 2024-02-24 PROCEDURE — 84443 ASSAY THYROID STIM HORMONE: CPT

## 2024-02-24 PROCEDURE — 85025 COMPLETE CBC W/AUTO DIFF WBC: CPT

## 2024-02-24 RX ORDER — SEMAGLUTIDE 0.68 MG/ML
0.5 INJECTION, SOLUTION SUBCUTANEOUS WEEKLY
Qty: 3 ML | Refills: 0 | Status: SHIPPED | OUTPATIENT
Start: 2024-02-24 | End: 2024-03-23

## 2024-02-24 NOTE — PROGRESS NOTES
No action needed already discussed with rubén. Reduced trulicity to 10units daily and increased ozempic to 0.5mg as of today 2/24 for next 4 weeks and titrate to 1mg weekly from March 23rd 2024

## 2024-02-24 NOTE — PROGRESS NOTES
History of Present Illness   Patient ID: Dawson Ibarra is a 48 year old male.  Chief Complaint: Physical   :   Tom [id number]    Dawson Ibarra is a pleasant 48 year old male with PMHx DM2 with hyperglycemia, dyslipidemia, who presents for annual physical exam. Dawson Ibarra is having 3-4 hypoglycemic episodes at night over the past 2 weeks since increased his tresiba. He has had multiple warning of Hypogylcemia on DEXCOM and was afraid to take 5th dose of ozempic .25 on Monday.       Seen by   Endo: increase Tresiba 10->16U daily, increase metformin 500mg po BID  Continue ozempic 0.25mg weekly for 3 weeks and increase to 0.5mg weekly    Also complaining of hypoglycemic episodes at night  3 times 60-45 overnight, was afraid of hypoglycemia .25    Health Maintenance  - All care gaps addressed with patient.   Health Maintenance Due   Topic Date Due    Annual Physical  Never done    Diabetes Care Foot Exam  Never done    Diabetes Care Dilated Eye Exam  Never done    Colorectal Cancer Screening  Never done    COVID-19 Vaccine (3 - 2023-24 season) 09/01/2023    Influenza Vaccine (1) 10/01/2023    Annual Depression Screening  Never done       Colonoscopy (45+): overdue  Heart Scan/Calcium CT: ordered today    Review of Systems  Review of Systems   Constitutional: Negative.    Eyes: Negative.    Respiratory: Negative.     Cardiovascular: Negative.    Gastrointestinal: Negative.    Endocrine: Negative.         Hypogylcemia (tremor and shaking nocturnally)   Genitourinary: Negative.    Musculoskeletal: Negative.    Skin: Negative.    Allergic/Immunologic: Negative.    Neurological: Negative.    Hematological: Negative.    Psychiatric/Behavioral: Negative.         Physical Exam  Vitals:    02/24/24 0801   BP: 119/73   Pulse: 75   Weight: 139 lb (63 kg)   Height: 5' 5\" (1.651 m)     Body mass index is 23.13 kg/m².  BP Readings from Last 3 Encounters:   02/24/24 119/73   01/29/24 125/87    01/29/24 123/90     Physical Exam  Vitals reviewed.   Constitutional:       Appearance: Normal appearance.   HENT:      Head: Normocephalic and atraumatic.      Right Ear: Tympanic membrane normal.      Left Ear: Tympanic membrane normal.      Nose: Nose normal.   Eyes:      Extraocular Movements: Extraocular movements intact.      Pupils: Pupils are equal, round, and reactive to light.   Cardiovascular:      Rate and Rhythm: Normal rate and regular rhythm.      Pulses: Normal pulses.      Heart sounds: Normal heart sounds.   Pulmonary:      Effort: Pulmonary effort is normal.      Breath sounds: Normal breath sounds.   Abdominal:      General: Bowel sounds are normal.      Palpations: Abdomen is soft.      Tenderness: There is no abdominal tenderness.   Musculoskeletal:         General: Normal range of motion.      Cervical back: Normal range of motion and neck supple.   Feet:      Right foot:      Protective Sensation: 5 sites tested.  5 sites sensed.      Skin integrity: Skin integrity normal.      Left foot:      Protective Sensation: 5 sites tested.  5 sites sensed.      Skin integrity: Skin integrity normal.      Comments: Bilateral barefoot skin diabetic exam is normal, visualized feet and the appearance is normal.  Bilateral monofilament/sensation of both feet is normal.  Pulsation pedal pulse exam of both lower legs/feet is normal as well.       Skin:     Capillary Refill: Capillary refill takes less than 2 seconds.   Neurological:      General: No focal deficit present.      Mental Status: He is alert and oriented to person, place, and time. Mental status is at baseline.   Psychiatric:         Mood and Affect: Mood normal.           Labs & Imaging  Pertinent labs and imaging reviewed.   Lab Results   Component Value Date     (H) 02/24/2024    BUN 13 02/24/2024    BUNCREA 13.1 02/24/2024    CREATSERUM 0.99 02/24/2024    ANIONGAP 7 02/24/2024    GFRNAA > 60 06/19/2011    GFRAA > 60 06/19/2011    CA  10.1 02/24/2024    OSMOCALC 293 02/24/2024    ALKPHO 59 02/24/2024    AST 23 02/24/2024    ALT 30 02/24/2024    ALKPHOS 50 06/19/2011    BILT 0.3 02/24/2024    TP 8.2 02/24/2024    ALB 4.7 02/24/2024    GLOBULIN 3.5 02/24/2024    AGRATIO 1.6 06/19/2011     02/24/2024    K 5.0 02/24/2024     02/24/2024    CO2 28.0 02/24/2024     Lab Results   Component Value Date    A1C 9.1 (A) 02/24/2024     Lab Results   Component Value Date    WBC 6.3 02/24/2024    RBC 4.97 02/24/2024    HGB 15.2 02/24/2024    HCT 43.0 02/24/2024    MCV 86.5 02/24/2024    MCH 30.6 02/24/2024    MCHC 35.3 02/24/2024    RDW 11.8 02/24/2024    .0 (L) 02/24/2024    MPV 11.8 (H) 06/19/2011     Lab Results   Component Value Date    CHOLEST 97 02/24/2024    TRIG 82 02/24/2024    HDL 29 (L) 02/24/2024    LDL 51 02/24/2024    VLDL 12 02/24/2024    NONHDLC 68 02/24/2024     The ASCVD Risk score (Laureano ELDER, et al., 2019) failed to calculate for the following reasons:    The valid total cholesterol range is 130 to 320 mg/dL    Medical History    Reviewed allergies:  No Known Allergies     Reviewed:  There are no problems to display for this patient.     Reviewed:  Past Medical History:   Diagnosis Date    Diabetes (HCC)     Hyperlipidemia       Reviewed:  Family History   Problem Relation Age of Onset    Diabetes Mother     Diabetes Father     Other (infection of foot) Father     Diabetes Sister     Diabetes Brother        Reviewed:  Past Surgical History:   Procedure Laterality Date    HERNIA SURGERY      10 years ago      Reviewed:  Social History     Socioeconomic History    Marital status:    Tobacco Use    Smoking status: Never    Smokeless tobacco: Never   Vaping Use    Vaping Use: Never used   Substance and Sexual Activity    Alcohol use: Yes     Comment: social    Drug use: Never      Reviewed:  Current Outpatient Medications   Medication Sig Dispense Refill    semaglutide (OZEMPIC, 0.25 OR 0.5 MG/DOSE,) 2 MG/3ML Subcutaneous  Solution Pen-injector Inject 0.5 mg into the skin once a week for 28 days. 3 mL 0    Continuous Blood Gluc Sensor (DEXCOM G7 SENSOR) Does not apply Misc 1 each Every 10 days. Use as directed every 10 days 9 each 0    Blood Glucose Monitoring Suppl (ONETOUCH VERIO) w/Device Does not apply Kit 1 strip by In Vitro route in the morning and 1 strip before bedtime. 1 kit 0    Glucose Blood (ONETOUCH VERIO) In Vitro Strip 1 strip by In Vitro route in the morning and 1 strip before bedtime. 200 strip 0    OneTouch Delica Lancets 33G Does not apply Misc Inject 1 Lancet as directed in the morning and 1 Lancet before bedtime. 200 each 2    Insulin Degludec (TRESIBA) 100 UNIT/ML Subcutaneous Solution Inject 10 Units into the skin daily. 1 each 1    Insulin Pen Needle 33G X 4 MM Does not apply Misc 1 Application daily. 100 each 3    metFORMIN 500 MG Oral Tab Take 1 tablet (500 mg total) by mouth 2 (two) times daily with meals for 30 days, THEN 2 tablets (1,000 mg total) 2 (two) times daily with meals. 300 tablet 0    Insulin Pen Needle (PEN NEEDLES) 32G X 4 MM Does not apply Misc 1 each 4 (four) times daily before meals and nightly. 400 each 9    rosuvastatin 20 MG Oral Tab Take 1 tablet (20 mg total) by mouth nightly. FOR CHOLESTEROL. 90 tablet 3          Assessment & Plan    1. Annual physical exam  - GASTRO - INTERNAL  - PSA, Total W Reflex To Free; Future  - Vitamin D; Future  - TSH W Reflex To Free T4; Future  - Lipid Panel; Future  - CBC With Differential With Platelet; Future  - Comp Metabolic Panel (14); Future  - CT CALCIUM SCORING; Future  Patient here for physical exam and due for following.  Plan:  -order the following Labs: CBC, CMP, Lipid Panel, A1C, TSH, Vitamin D, PSA  -Referral to GI for screening colonoscopy  -Order screening mammgram and reflex to ultrasound if indicated  -Discussed benefit for Screening for Cardiac CT scan for evaluation of cardiac arthrosclerosis, ordered Calcium CT scan     2. Type 2  diabetes mellitus with other specified complication, unspecified whether long term insulin use (HCC)  - POC Glycohemoglobin [66255]  - Diabetic Retinopathy Exam  OU - Both Eyes; Future  Lab Results   Component Value Date    A1C 9.1 (A) 02/24/2024    A1C 12.7 (A) 01/22/2024       Last A1c value was 9.1% done 2/24/2024.   Significant improvement, however now with hypoglycemic episodes.  In the mid 40s to the 60s not currently 3-4 times a week for the last 2 weeks since his Tresiba was increased.  Plan:  Educated patient to reduce his Tresiba from 16 back to 10 units as I have previously prescribed.  -Patient also educated to increase his Ozempic to 5.0 mg subcutaneous weekly injection for the next 4 weeks.  Followed by increasing to 1 mg.  Educated patient to watch out for any gastrointestinal side effects.  Reassured patient that Ozempic does not have hypoglycemic side effects likely is from his insulin.  Ultimate goal is to have his A1c with continue reduction to get him off insulin ideally within the next 6 months and keep him purely just on metformin and Ozempic.  Patient to follow-up with endocrine APRN in 2 to 3 weeks and myself in 1 month.    3. Dyslipidemia  - Lipid Panel; Future  Given type 2 diabetes mellitus obtain baseline lipid panel since he is improved this treatment.  In consideration of Crestor whether to stay on 20 mg or decreased to 10 mg.    4. Encounter for screening colonoscopy  - GASTRO - INTERNAL  Patient 40-year-old male who has never had a colonoscopy due to guidelines change in the past recent years is due for screening colonoscopy.  Plan:  - Referred to GI for screening colonoscopy.  5. Vitamin D deficiency  - Vitamin D; Future  Patient lives in [place] and works indoors with high likelihood of having vitamin D deficiency.  Plan:  Obtain baseline vitamin D level, if it is low start 5,000 IU international units daily supplementation  6. Iron deficiency anemia, unspecified iron deficiency  anemia type  - Iron And Tibc [E]; Future  - Ferritin [E]; Future  Screen for iron deficiency anemia obtain iron and TIBC and ferritin level.  If low will start ferrous sulfate 325 mg p.o. daily and recheck next year  -Patient to follow-up in 1 month to monitor overall clinical improvement.  If he is having modest improvement will also follow-up in May and hopefully space out his appointments at that time        Follow Up:   Return in about 2 months (around 4/24/2024), or if symptoms worsen or fail to improve, for Diabetes Follow up .      Santo Graham MD  Internal Medicine      Patient asked to sign release of information for outside records if not already requested, make future office/imaging appointments at the  prior to leaving, and to sign up for [facility] if not already active.  Preventive measures and further education discussed with patient as per after visit summary. Potential medication side effects discussed. All questions answered to best of ability.   Call office with any questions. Seek emergency care if necessary.   Patient understands and agrees to follow directions and advice.      ----------------------------------------- PATIENT INSTRUCTIONS-----------------------------------------     There are no Patient Instructions on file for this visit.

## 2024-02-25 ENCOUNTER — TELEPHONE (OUTPATIENT)
Dept: ENDOCRINOLOGY CLINIC | Facility: CLINIC | Age: 49
End: 2024-02-25

## 2024-02-25 DIAGNOSIS — D50.9 IRON DEFICIENCY ANEMIA, UNSPECIFIED IRON DEFICIENCY ANEMIA TYPE: ICD-10-CM

## 2024-02-25 DIAGNOSIS — E55.9 VITAMIN D DEFICIENCY: Primary | ICD-10-CM

## 2024-02-25 RX ORDER — CHOLECALCIFEROL (VITAMIN D3) 125 MCG
1 CAPSULE ORAL DAILY
Qty: 90 CAPSULE | Refills: 3 | Status: SHIPPED | OUTPATIENT
Start: 2024-02-25 | End: 2025-02-19

## 2024-02-25 RX ORDER — FERROUS SULFATE 325(65) MG
325 TABLET ORAL
Qty: 90 TABLET | Refills: 3 | Status: SHIPPED | OUTPATIENT
Start: 2024-02-25 | End: 2025-02-19

## 2024-02-25 NOTE — TELEPHONE ENCOUNTER
Endo staff: patient had been having hypoglycemic episodes per recent visit (2/24) with PCP and medication doses were adjusted.     Please follow up with him to make sure hypoglycemia is resolved and review current BG readings.     Current med regimen:   - ozempic 0.5mg once weekly   -Tresiba 10 units daily   -  MTF 500mg twice daily     Thank you!

## 2024-02-26 NOTE — TELEPHONE ENCOUNTER
José Luis Casas, I just Acknoledged and +signed the form. Please verify if it looks good and relay to patient/and employer thank you

## 2024-02-26 NOTE — TELEPHONE ENCOUNTER
LOV 1/29/24. F/U is scheduled with Yessenia 3/11/24.    Call conducted with  ID [id number].     He was having low blood sugars about Every other day 45-50 at [facility]. Saw PCP on Saturday. Medications were adjusted. Confirmed he is taking medications as per the following:  - ozempic 0.5mg once weekly on Saturdays  -Tresiba 10 units daily at 5pm  -  MTF 500mg twice daily with meals    Since adjustements on Saturday he has had no low blood sugars. Feels well. Reminded of upcoming appt in March. He states he is running out of dexcom sensors. Per prescritpion history he should have 2 more months of refills at the pharmacy. Reminded to change every 10 days. Advised to call if having trouble refilling sensors. No further questions at this time.     Dexcom readings for past several days, adjustments were made Saturday:

## 2024-02-26 NOTE — TELEPHONE ENCOUNTER
Ok noted.     Per CGM data over the weekend, it seems that he has had higher glucose readings during the day, however normal (now) overnight. This seems to be a pattern on the weekends and not necessarily during the week.     Would recommend that he takes an additional MTF with lunch meal of the weekends if planning to eat a higher carb meal.     Thank you!

## 2024-02-26 NOTE — PROGRESS NOTES
Please relay to pt (Chinese speaking)  Hello, after review of your labs here are your recommendations:    # Lipids/cholesterol: Given diagnosis of Diabetes I want you to remain on rosuvastatin to help protect your heart from having plaque build up in your arteries.       # CMP: Your comprehensive metabolic panel shows overall stable functioning kidneys (creatinine, GFR), liver (AST, ALT, Bilirubin), and electrolytes (sodium, potassium, calcium). Slight variations in other values such as BUN/Creat, Serum Osm, anion gap, chloride, etc are not of clinical value at this time.     # CBC: Your complete blood count shows overall stable red blood cells, white blood cells, platelets (help you stop bleeding), and hematocrit (thickness of blood),  Slight variations in other values such as RDW/sw, MCH are not of clinical value at this time.     # TSH: Your thyroid (TSH) function is normal.     # Vitamins: Your vitamin D level is low. If not already taking, please start over-the-counter vitamin D3 5000 IU daily with meals to help replace and maintain your Vitamin D level. If you are taking 5,000 units let me know for further dose instructions. We will recheck your Vitamin D level yearly, sooner if clinically indicated such as osteoporosis.     # Diabetes/A1C: Your A1C shows shows Last A1c value was 9.1% done 2/24/2024.  Please continue on medication regiment as we discussed, and lifestyle changes.    # Prostate: Your prostate level, ie PSA, is normal.     If you have any questions or concerns in regards to these labs please let me know and schedule follow up to discuss. -Dr. Graham

## 2024-02-26 NOTE — TELEPHONE ENCOUNTER
Dr. Graham,     *The ACKNOWLEDGE button has been moved to the top right ribbon*    Please sign off on form if you agree to: Disab pt was off work d/t diabetes 1/22/24-2/4/24  (place your signature on the first page only)    -From your Inbasket, Highlight the patient and click Chart   -Double click the 2/10/24 Forms Completion telephone encounter  -Scroll down to the Media section   -Click the blue Hyperlink: Lary Graham 2/26/24  -Click Acknowledge located in the top right ribbon/menu   -Drag the mouse into the blank space of the document and a + sign will appear. Left click to   electronically sign the document.     Thank you,    Alejandra WALLACE

## 2024-02-27 NOTE — PROGRESS NOTES
Hello yes. I sent iron to his pharmacy for iron def anemia. His platelets are lower side but likely from lifestyle. Will continue repeat with next physical or unless he has signs of easy bruising or small rashes on his skin to follow up sooner.

## 2024-03-07 ENCOUNTER — TELEPHONE (OUTPATIENT)
Dept: INTERNAL MEDICINE CLINIC | Facility: CLINIC | Age: 49
End: 2024-03-07

## 2024-03-07 NOTE — TELEPHONE ENCOUNTER
Current Outpatient Medications:     metFORMIN 500 MG Oral Tab, Take 1 tablet (500 mg total) by mouth 2 (two) times daily with meals for 30 days, THEN 2 tablets (1,000 mg total) 2 (two) times daily with meals., Disp: 300 tablet, Rfl: 0

## 2024-03-09 ENCOUNTER — LAB ENCOUNTER (OUTPATIENT)
Dept: LAB | Facility: HOSPITAL | Age: 49
End: 2024-03-09
Attending: UROLOGY
Payer: COMMERCIAL

## 2024-03-09 DIAGNOSIS — R31.29 MICROHEMATURIA: ICD-10-CM

## 2024-03-09 LAB
BILIRUB UR QL: NEGATIVE
GLUCOSE UR-MCNC: NORMAL MG/DL
HGB UR QL STRIP.AUTO: NEGATIVE
KETONES UR-MCNC: NEGATIVE MG/DL
LEUKOCYTE ESTERASE UR QL STRIP.AUTO: 250
NITRITE UR QL STRIP.AUTO: NEGATIVE
PH UR: 5.5 [PH] (ref 5–8)
PROT UR-MCNC: NEGATIVE MG/DL
SP GR UR STRIP: 1.01 (ref 1–1.03)
UROBILINOGEN UR STRIP-ACNC: NORMAL

## 2024-03-09 PROCEDURE — 81001 URINALYSIS AUTO W/SCOPE: CPT

## 2024-03-09 PROCEDURE — 87086 URINE CULTURE/COLONY COUNT: CPT

## 2024-03-11 ENCOUNTER — OFFICE VISIT (OUTPATIENT)
Dept: ENDOCRINOLOGY CLINIC | Facility: CLINIC | Age: 49
End: 2024-03-11
Payer: COMMERCIAL

## 2024-03-11 ENCOUNTER — OFFICE VISIT (OUTPATIENT)
Dept: SURGERY | Facility: CLINIC | Age: 49
End: 2024-03-11

## 2024-03-11 VITALS
WEIGHT: 140 LBS | BODY MASS INDEX: 23.32 KG/M2 | HEART RATE: 80 BPM | HEIGHT: 65 IN | DIASTOLIC BLOOD PRESSURE: 77 MMHG | SYSTOLIC BLOOD PRESSURE: 133 MMHG

## 2024-03-11 DIAGNOSIS — R31.29 MICROHEMATURIA: Primary | ICD-10-CM

## 2024-03-11 DIAGNOSIS — N12 PYELONEPHRITIS: ICD-10-CM

## 2024-03-11 DIAGNOSIS — E11.69 TYPE 2 DIABETES MELLITUS WITH OTHER SPECIFIED COMPLICATION, UNSPECIFIED WHETHER LONG TERM INSULIN USE (HCC): ICD-10-CM

## 2024-03-11 DIAGNOSIS — E11.65 UNCONTROLLED TYPE 2 DIABETES MELLITUS WITH HYPERGLYCEMIA (HCC): Primary | ICD-10-CM

## 2024-03-11 LAB
GLUCOSE BLOOD: 132
TEST STRIP LOT #: NORMAL NUMERIC

## 2024-03-11 PROCEDURE — 99213 OFFICE O/P EST LOW 20 MIN: CPT | Performed by: UROLOGY

## 2024-03-11 RX ORDER — FAMOTIDINE 40 MG/1
TABLET, FILM COATED ORAL
COMMUNITY
Start: 2024-02-18

## 2024-03-11 RX ORDER — INSULIN DEGLUDEC 100 U/ML
10 INJECTION, SOLUTION SUBCUTANEOUS DAILY
Qty: 9 ML | Refills: 1 | Status: SHIPPED | OUTPATIENT
Start: 2024-03-11 | End: 2024-06-09

## 2024-03-11 RX ORDER — ACYCLOVIR 400 MG/1
1 TABLET ORAL
Qty: 9 EACH | Refills: 1 | Status: SHIPPED | OUTPATIENT
Start: 2024-03-11

## 2024-03-11 NOTE — PATIENT INSTRUCTIONS
A1C: 9,1 % el 24/02/2024 --> disminuyó del 12,7 % el 22/01/2024  Glucosa en josefina: 132 en la clínica hoy    Medicamentos:  - continuar con Tresiba 10 unidades diarias  - continuar con metformina hasta 500 mg dos veces al día  - continuar con Ozempic 0,5 mg semanal    - continuar siguiendo rivera dieta baja en carbohidratos  - aumentar las proteínas con las comidas - akhil vidales (filete o chuletas de ternera y cerdo)      Peso:  Lecturas de peso de los últimos 6 encuentros:  11/03/24 140 libras (63,5 kg)  24/02/24 139 libras (63 kg)  29/01/24 141 libras (64 kg)  29/01/24 141 libras (64 kg)  24/01/24 144 libras (65,3 kg)  23/01/24 153 libras 3,5 onzas (69,5 kg)    Objetivo A1C:  <7,0%    Pruebas de azúcar en josefina:  Continuar usando el glucómetro continuo Dexcom G7    Objetivos de azúcar en josefina:  Antes del desayuno:  (preferiblemente < 110)  2 horas después de las comidas: <180 (preferiblemente <150)    Llame para niveles de azúcar en josefina persistentes < 75 o > 200

## 2024-03-11 NOTE — TELEPHONE ENCOUNTER
Pt is requesting refill for the following medication        metFORMIN 500 MG Oral Tab, Take 1 tablet (500 mg total) by mouth 2 (two) times daily with meals for 30 days, THEN 2 tablets (1,000 mg total) 2 (two) times daily with meals., Disp: 300 tablet, Rfl: 0

## 2024-03-11 NOTE — TELEPHONE ENCOUNTER
Routing through protocol    Last OV 02/24/24  Metformin was ordered on 01/22/24 with 2 sets of instructions for patient to increase the dose after 30 days. Rx was updated to reflect new directions.     metFORMIN 500 MG Oral Tab 300 tablet 0 1/22/2024 4/21/2024   Sig:   Take 1 tablet (500 mg total) by mouth 2 (two) times daily with meals for 30 days, THEN 2 tablets (1,000 mg total) 2 (two) times daily with meals.

## 2024-03-11 NOTE — PROGRESS NOTES
: Ruben Batres   ID# [id number]     Barbara  [id number]      Name: Dawson Ibarra  Date: 3/11/2024    CHIEF COMPLAINT   Chief Complaint   Patient presents with    Diabetes     HISTORY OF PRESENT ILLNESS   Dawson Ibarra is a 48 year old male who presents for follow up on diabetes management.   HbA1C: 9.1% on 2/24/2024. This is decreased from 12.7% on 1/22/2024.   Blood glucose is:  132 in clinic today.     FAMILY HISTORY OF DIABETES  -mother and father and brother   DIABETES HISTORY  Diagnosed: around 8 days ago   Prior HbA, C or glycohemoglobin were 12.7% on 1/22/2024; 9.1% on 2/24/2024; 9.1% 2/24/2024;     Patient has not had hospitalizations for blood sugar issues.    Denies any history of pancreatitis.     PREVIOUS MEDICATION FOR DM:  None     CURRENT MEDICATIONS FOR DM:  - Tresiba 10 units daily at 12pm   - Metformin 1,000mg twice daily  - Ozempic 0.5mg once weekly     HOME GLUCOSE READINGS:   Dexcom G7 CGM download reviewed with patient. The results revealed (2/27 to 3/11):     Average glucose: 137 mg/dl     In target range: (70-180mg/dl) : 89%     High glucose targets: (> 180mg/dl): 10%     Very high glucose targets: (> 250mg/dl): 1%     Low glucose targets: (less than 70mg/dl): <1%     Very Low glucose targets: (< 54mg/dl ): 0%     Glucose Management Indicator (GMI): n/a  Glucose Variability: 33mg/dL    Continuous Glucose Monitoring Interpretation    Dawson Ibarra has undergone continuous glucose monitoring with the personal Dexcom G7 continuous glucose monitor. The blood glucose tracings were evaluated for two weeks prior to office visit. His blood glucose tracings demonstrated well controlled glucose patterns that are mostly at goal. He did not experience any hypoglycemia during the week of evaluation.     HISTORY OF DIABETES COMPLICATIONS:  History of Retinopathy: denies - last eye exam within the last 12 months: no  History of Neuropathy: no   History of Nephropathy: no      ASSOCIATED COMPLICATIONS:   HTN: no   Hyperlipidemia: yes   Cardiovascular Disease: no    Peripheral Vascular Disease: no     DIETARY COMPLIANCE:  Eating around 3 meals per day     EXERCISE:   No     Polyuria, polyphagia, polydipsia: no   Paresthesias: no   Blurred vision: yes   Recent steroids, illness or infections: no     REVIEW OF SYSTEMS  Constitutional: Negative for: weight change, fever, fatigue, cold/heat intolerance  Eyes: Negative for:  Visual changes, proptosis, blurring  ENT: Negative for:  dysphagia, neck swelling, dysphonia  Respiratory: Negative for: hemoptysis, shortness of breath, cough, or dyspnea.  Cardiovascular: Negative for:  chest pain, chest discomfort, palpitations  GI: Negative for:  abdominal pain, nausea, vomiting, diarrhea, heartburn, constipation  Neurology: Negative for: headache, dizziness, syncope, numbness/tingling, or weakness.   Genito-Urinary: Negative for: dysuria, frequency or hematuria   Hematology/Lymphatics: Negative for: bruising, easy bleeding, lower extremity edema  Skin: Negative for: rash, blister, infection or ulcers.  Endocrine: Negative for: polyuria, polydipsia. no osteoporosis. no thyroid disease.     MEDICATIONS:     Current Outpatient Medications:     Cholecalciferol (VITAMIN D) 125 MCG (5000 UT) Oral Cap, Take 1 capsule (5,000 Units total) by mouth daily., Disp: 90 capsule, Rfl: 3    Ferrous Sulfate 325 (65 Fe) MG Oral Tab, Take 1 tablet (325 mg total) by mouth daily with breakfast., Disp: 90 tablet, Rfl: 3    semaglutide (OZEMPIC, 0.25 OR 0.5 MG/DOSE,) 2 MG/3ML Subcutaneous Solution Pen-injector, Inject 0.5 mg into the skin once a week for 28 days., Disp: 3 mL, Rfl: 0    Continuous Blood Gluc Sensor (DEXCOM G7 SENSOR) Does not apply Misc, 1 each Every 10 days. Use as directed every 10 days, Disp: 9 each, Rfl: 0    Blood Glucose Monitoring Suppl (ONETOUCH VERIO) w/Device Does not apply Kit, 1 strip by In Vitro route in the morning and 1 strip before  bedtime., Disp: 1 kit, Rfl: 0    Glucose Blood (ONETOUCH VERIO) In Vitro Strip, 1 strip by In Vitro route in the morning and 1 strip before bedtime., Disp: 200 strip, Rfl: 0    OneTouch Delica Lancets 33G Does not apply Misc, Inject 1 Lancet as directed in the morning and 1 Lancet before bedtime., Disp: 200 each, Rfl: 2    Insulin Degludec (TRESIBA) 100 UNIT/ML Subcutaneous Solution, Inject 10 Units into the skin daily., Disp: 1 each, Rfl: 1    Insulin Pen Needle 33G X 4 MM Does not apply Misc, 1 Application daily., Disp: 100 each, Rfl: 3    metFORMIN 500 MG Oral Tab, Take 1 tablet (500 mg total) by mouth 2 (two) times daily with meals for 30 days, THEN 2 tablets (1,000 mg total) 2 (two) times daily with meals., Disp: 300 tablet, Rfl: 0    Insulin Pen Needle (PEN NEEDLES) 32G X 4 MM Does not apply Misc, 1 each 4 (four) times daily before meals and nightly., Disp: 400 each, Rfl: 9    rosuvastatin 20 MG Oral Tab, Take 1 tablet (20 mg total) by mouth nightly. FOR CHOLESTEROL., Disp: 90 tablet, Rfl: 3    ALLERGIES:   No Known Allergies    SOCIAL HISTORY:   Social History     Socioeconomic History    Marital status:    Tobacco Use    Smoking status: Never    Smokeless tobacco: Never   Vaping Use    Vaping Use: Never used   Substance and Sexual Activity    Alcohol use: Yes     Comment: social    Drug use: Never       PAST MEDICAL HISTORY:   Past Medical History:   Diagnosis Date    Diabetes (HCC)     Hyperlipidemia        PAST SURGICAL HISTORY:   Past Surgical History:   Procedure Laterality Date    HERNIA SURGERY      10 years ago       PHYSICAL EXAM:   Vitals:    03/11/24 1250   BP: 133/77   Pulse: 80   Weight: 140 lb (63.5 kg)   Height: 5' 5\" (1.651 m)     BMI:   Body mass index is 23.3 kg/m².    General Appearance:  alert, well developed, in no acute distress  Nutritional:  no extreme weight gain or loss  Head: Atraumatic  Eyes:  normal conjunctivae, sclera., normal sclera and normal pupils  Throat/Neck: normal  sound to voice. Normal hearing, normal speech  Back: no kyphosis  Respiratory:  Speaking in full sentences, non-labored. no increased work of breathing, no audible wheezing    Skin:  normal moisture and skin texture, no visible lesions  Hair and nails: normal scalp hair  Hematologic:  no excessive bruising  Neuro: motor grossly intact, moving all extremities without difficulty  Psychiatric:  oriented to time, self, and place  Extremities: no obvious extremity swelling, no lesions    Diabetic Foot Exam:  Bilateral barefoot skin diabetic exam is normal, visualized feet and the appearance is normal.  Bilateral monofilament/sensation of both feet is normal.  Pulsation pedal pulse exam of both lower legs/feet is normal as well.    LABS: Pertinent labs reviewed    ASSESSMENT/PLAN:    -Reviewed with patient the pathogenesis of diabetes, clinical significance of A1c, and common complications such as: microvascular, macrovascular and diabetic ketoacidosis. Patient verbalizes understanding of the importance of glycemic control and the goals of therapy.   -Discussed with patient glucose targets ranges (Fasting  and post prandial <180).     1.Type 2 Diabetes Mellitus, uncontrolled   -LAB DATA  HbA,C: 9.1% on 2/24/2024  a) Medications  - continue with Tresiba 10 units daily   - continue with Metformin to 500mg twice daily    - discussed to take 1 extra tablet daily as needed with higher carb meal  - continue with Ozempic 0.5mg weekly - will try to avoid increasing dose in order to avoid weight loss.       -congratulated on improved blood glucose readings  - reviewed option of transitioning from tresiba to glimepiride, however patient will like to continue using Dexcom CGM as it is helping manage diabetes. Insurance will only cover CGM if using 1x insulin per day. Will continue to use tresiba for now.   - discussed that he continues to limit carbs to 45-60gm per meal; 180gm per day   - discussed to continue to stay active  as currently doing    -reviewed target goal BG readings and A1C  -reviewed when to call and notify me of abnormal BG readings.      b) Nephropathy: GFR: 94 on 2024 and urine MA: 46.5 on 2024   c) -has upcoming apt with Dr. Owusu in the next 2 weeks   d) Foot exam: normal today 3/11/2024  e) cont. using Dexcom G7 cgm.   f) Life style changes reviewed     2. Blood Pressure Management   -normotensive today     3.Hyperlipidemia   - on rosuvastatin 20mg at bedtime  - LDL: 51 and Tri on 2024  - LDL goal per ADA guidelines <70      RTC in 3 months   Patient instructed to call sooner if they develop Blood glucose readings <75 and/or if they have readings persistently >200.     The risks and benefits of my recommendations, as well as other treatment options were discussed with the patient today. questions were also answered to the best of my knowledge. Patient verbalizes understanding of these issues and agrees to the plan.    3/11/2024  ITA Cadena

## 2024-03-11 NOTE — PROGRESS NOTES
[facility]  Follow-Up Visit    HPI: Dawson Ibarra is a 48 year old male presents for a follow up visit. Patient was last seen on 1/29/24.  Language line  utilized for this visit.   ID [id number].    INTERVAL HISTORY: Following up regarding microhematuria and pyelonephritis.  These were noted upon his initial diagnosis of diabetes.    Patient has been compliant with his diabetic medications.  His hemoglobin A1c is significantly improved since diagnosis.  He follows with endocrinology.    We had him submit a repeat urinalysis before his office visit today which he did.  Urinalysis from 3/9/2024 without evidence of microscopic hematuria.  Urine culture negative.    He denies any gross hematuria or dysuria.    His voiding symptoms are back to baseline.  He reports much improved urinary frequency and urgency.  His IPSS is 3 with a quality-of-life score of 1.      1.  Pyelonephritis  2.  Microscopic hematuria  Patient with recently diagnosed type II DM January 2024.  His hemoglobin A1c is about 12.7%.     He has been to the emergency room and urgent care clinic about 3 times for further evaluation of generalized fatigue, tiredness, fevers, upper respiratory symptoms, vomiting and bodyaches.     He was diagnosed with diabetes during 1 of those visits where he was complaining of polyuria and polydipsia.  CT abdomen pelvis with IV contrast 1/25/2024 revealing findings that raise suspicion for left greater than right ascending UTI/pyelonephritis.     His urinalysis from 1/24/2024 revealed 1+ leuks, no nitrites, 2+ blood, 21-50 WBCs, 3-5 RBCs, and no bacteria.  This did not reflex to a culture.     Treated with Keflex 4 times daily for 10 days.  He reports improvement in his symptoms.     He has also recently been started on treatment for his diabetes.     He denies gross hematuria, history of nephrolithiasis, recurrent UTIs.     No significant voiding symptoms.  His IPSS  is 10 (0/2/0/2/2/0/4) with a quality-of-life score of 3.     - 1/2024 consult: PVR 0 mL.        PAST MEDICAL HISTORY: DM.  HLD.     PAST SURGICAL HISTORY: Bilateral inguinal hernia repair.     SOCIAL HISTORY:  and has 3 children.  No smoking or illicit drug use.  Social alcohol.  He is a metal .     Reviewed past medical, surgical, family, and social history.  Reviewed med list and allergies.      REVIEW OF SYSTEMS:  Pertinent positives and negatives per HPI. A 10-point ROS was performed and is otherwise negative.       EXAM:  There were no vitals taken for this visit.    Physical Exam  Constitutional:       Appearance: He is well-developed.   HENT:      Head: Normocephalic.   Eyes:      General: No scleral icterus.  Cardiovascular:      Rate and Rhythm: Normal rate.   Pulmonary:      Effort: Pulmonary effort is normal.   Skin:     General: Skin is warm and dry.   Neurological:      Mental Status: He is alert and oriented to person, place, and time.   Psychiatric:         Mood and Affect: Mood normal.         Behavior: Behavior normal.       PATHOLOGY:  No results found.      LABS:  See HPI for details.      IMAGING:  No results found.      UROLOGY PROCEDURE:  None performed today.      IMPRESSION:  48 year old male with newly diagnosed type II DM.  Found to have radiographic findings suggestive of pyelonephritis upon ER evaluation of generalized fatigue, body aches, fevers, nausea.     Urinalysis without clear signs of UTI.  Did not reflex to a culture.     Mild microscopic hematuria noted on urinalysis from 1/24/2024.    Recent UA without any evidence of microscopic hematuria.  He reports good compliance with diabetic medications and return of his voiding symptoms to baseline and is not bothered at this point.    Reviewed with patient.  UA results discussed.  No evidence of microscopic hematuria.     Discussed the importance of strict diabetic control.    No further urologic evaluation or  follow-up indicated at this time.    All questions answered.    Jimmie Macdonald MD  3/11/2024

## 2024-03-12 NOTE — TELEPHONE ENCOUNTER
Please review; protocol failed/ has no protocol    Juliana Bernal, RNYesterday (3:07 PM)       Routing through protocol     Last OV 02/24/24  Metformin was ordered on 01/22/24 with 2 sets of instructions for patient to increase the dose after 30 days. Rx was updated to reflect new directions.      metFORMIN 500 MG Oral Tab 300 tablet 0 1/22/2024 4/21/2024   Sig:   Take 1 tablet (500 mg total) by mouth 2 (two) times daily with meals for 30 days, THEN 2 tablets (1,000 mg total) 2 (two) times daily with meals.              Please see message below for upcoming appointment.    Future Appointments   Date Time Provider Department Center   4/20/2024  8:00 AM Snato Graham MD [facility]                Requested Prescriptions   Pending Prescriptions Disp Refills    metFORMIN 500 MG Oral Tab 360 tablet 1     Sig: Take 2 tablets (1,000 mg total) by mouth 2 (two) times daily with meals.       Diabetes Medication Protocol Failed - 3/11/2024  3:07 PM        Failed - Last A1C < 7.5 and within past 6 months     Lab Results   Component Value Date    A1C 9.1 (A) 02/24/2024             Passed - In person appointment or virtual visit in the past 6 mos or appointment in next 3 mos     Recent Outpatient Visits              [facility]    [facility]    Yesterday Uncontrolled type 2 diabetes mellitus with hyperglycemia (HCC)    [facility], Skip Whitfield APRN    Office Visit    2 weeks ago [facility]    [facility]    1 month ago [facility]eloneph[facility]    [facility] Visit    1 month ago Uncontrolled type 2 diabetes mellitus with hyperglycemia (HCC)    [facility]    [facility] Visit          Future  Appointments         Provider Department Appt Notes    In 1 month Santo Graham MD [facility] Follow up    In 3 months Skip Glasgow APRN [facility]                Passed - Microalbumin procedure in past 12 months or taking ACE/ARB        Passed - EGFRCR or GFRNAA > 50     GFR Evaluation  EGFRCR: 94 , resulted on 2/24/2024          Passed - GFR in the past 12 months           Recent Outpatient Visits              Yesterday Microhematuria    [facility], Jimmie Perez MD    Office Visit    Yesterday Uncontrolled type 2 diabetes mellitus with hyperglycemia (HCC)    [facility] Skip Glasgow APRN    Office Visit    2 weeks ago Annual physical exam    [facility] Santo Graham MD    Office Visit    1 month ago Pyelonephritis    [facility], Jimmie Perez MD    Office Visit    1 month ago Uncontrolled type 2 diabetes mellitus with hyperglycemia (HCC)    [facility] Skip Glasgow APRN    Office Visit          Future Appointments         Provider Department Appt Notes    In 1 month Santo Graham MD [facility] Follow up    In 3 months Skip Glasgow APRN [facility]

## 2024-03-26 ENCOUNTER — TELEPHONE (OUTPATIENT)
Dept: ENDOCRINOLOGY CLINIC | Facility: CLINIC | Age: 49
End: 2024-03-26

## 2024-03-26 NOTE — TELEPHONE ENCOUNTER
Current Outpatient Medications   Medication Sig Dispense Refill    Continuous Blood Gluc Sensor (DEXCOM G7 SENSOR) Does not apply Misc 1 each Every 10 days. Use as directed every 10 days 9 each 1     Due to plan limitations

## 2024-03-28 NOTE — TELEPHONE ENCOUNTER
Medication PA Requested:   DEXCOM G7 SENSOR                                                        CoverMyMeds Used:  Key:  Quantity: 9  Day Supply: 90  Si each Every 10 days   DX Code:     E11.65

## 2024-03-29 NOTE — TELEPHONE ENCOUNTER
Medication PA Requested:   DEXCOM G7 SENSOR                                                        CoverMyMeds Used: No  Key:  Quantity: 9  Day Supply: 90  Si each Every 10 days   DX Code:     E11.65                 Per EPA,  Prior Authorization not required for patient/medication   Note from payer: Your PA has been resolved, no additional PA is required.  For further inquiries please contact the number on the back of the member prescription card. (Message [phone]) - Prescriber details have been updated to match the prescriber directory.       Message to Endo, please notify patient

## 2024-04-17 RX ORDER — SEMAGLUTIDE 0.68 MG/ML
0.5 INJECTION, SOLUTION SUBCUTANEOUS WEEKLY
Qty: 3 ML | Refills: 0 | Status: SHIPPED | OUTPATIENT
Start: 2024-04-17 | End: 2024-04-20

## 2024-04-17 NOTE — TELEPHONE ENCOUNTER
Please review; protocol failed/No Protocol    Requested Prescriptions   Pending Prescriptions Disp Refills    OZEMPIC, 0.25 OR 0.5 MG/DOSE, 2 MG/3ML Subcutaneous Solution Pen-injector [Pharmacy Med Name: OZEMPIC 0.25 OR 0.5MG/[id number] 3ML] 3 mL 0     Sig: Inject 0.5 mg into the skin once a week for 28 days.       Diabetes Medication Protocol Failed - 4/15/2024  7:24 PM        Failed - Last A1C < 7.5 and within past 6 months     Lab Results   Component Value Date    A1C 9.1 (A) 02/24/2024             Passed - In person appointment or virtual visit in the past 6 mos or appointment in next 3 mos     Recent Outpatient Visits              1 month ago Microhematuria    [facility], Jimmie Perez MD    Office Visit    1 month ago Uncontrolled type 2 diabetes mellitus with hyperglycemia (HCC)    [facility]    [facility]    1 month ago Annual physical exam    [facility] [place] [facility] [place] [facility]    Office Visit    2 months ago Pyelonephritis    [facility], Jimmie Perez MD    Office Visit    2 months ago Uncontrolled type 2 diabetes mellitus with hyperglycemia (HCC)    [facility]    [facility]          Future Appointments         Provider Department Appt Notes    In 3 days Santo Graham MD [facility] Diabetes fu last a1c 2/24/24 (due for dm eye exam)    In 1 month Skip Glasgow APRN [facility]                     Passed - Microalbumin procedure in past 12 months or taking ACE/ARB        Passed - EGFRCR or GFRNAA > 50     GFR Evaluation  EGFRCR: 94 , resulted on 2/24/2024          Passed - GFR in the past 12 months           Future Appointments         Provider Department Appt Notes    In 3 days  Santo Graham MD [facility] Diabetes fu last a1c 2/24/24 (due for dm eye exam)    In 1 month Skip Glasgow APRN [facility]           Recent Outpatient Visits              1 month ago Microhematuria    [facility], Jimmie Perez MD    Office Visit    1 month ago Uncontrolled type 2 diabetes mellitus with hyperglycemia (HCC)    [facility]    [facility] Visit    1 month ago Annual physical exam    [facility] Santo Graham MD    Office Visit    2 months ago Pyelonephritis    [facility], Jimmie Perez MD    Office Visit    2 months ago Uncontrolled type 2 diabetes mellitus with hyperglycemia (HCC)    [facility]    [facility] Visit

## 2024-04-20 ENCOUNTER — OFFICE VISIT (OUTPATIENT)
Dept: INTERNAL MEDICINE CLINIC | Facility: CLINIC | Age: 49
End: 2024-04-20

## 2024-04-20 VITALS
SYSTOLIC BLOOD PRESSURE: 136 MMHG | BODY MASS INDEX: 23 KG/M2 | WEIGHT: 141 LBS | HEART RATE: 82 BPM | DIASTOLIC BLOOD PRESSURE: 83 MMHG

## 2024-04-20 DIAGNOSIS — I25.83 CORONARY ARTERY DISEASE DUE TO LIPID RICH PLAQUE: ICD-10-CM

## 2024-04-20 DIAGNOSIS — R03.0 ELEVATED BLOOD PRESSURE READING: ICD-10-CM

## 2024-04-20 DIAGNOSIS — D50.9 IRON DEFICIENCY ANEMIA, UNSPECIFIED IRON DEFICIENCY ANEMIA TYPE: ICD-10-CM

## 2024-04-20 DIAGNOSIS — E78.5 DYSLIPIDEMIA: ICD-10-CM

## 2024-04-20 DIAGNOSIS — R91.1 PULMONARY NODULE, LEFT: ICD-10-CM

## 2024-04-20 DIAGNOSIS — Z12.11 ENCOUNTER FOR SCREENING COLONOSCOPY: ICD-10-CM

## 2024-04-20 DIAGNOSIS — I25.10 CORONARY ARTERY DISEASE DUE TO LIPID RICH PLAQUE: ICD-10-CM

## 2024-04-20 DIAGNOSIS — E11.69 TYPE 2 DIABETES MELLITUS WITH OTHER SPECIFIED COMPLICATION, UNSPECIFIED WHETHER LONG TERM INSULIN USE (HCC): Primary | ICD-10-CM

## 2024-04-20 LAB
CARTRIDGE EXPIRATION DATE: ABNORMAL DATE
HEMOGLOBIN A1C: 5.8 % (ref 4.3–5.6)

## 2024-04-20 PROCEDURE — 83036 HEMOGLOBIN GLYCOSYLATED A1C: CPT | Performed by: STUDENT IN AN ORGANIZED HEALTH CARE EDUCATION/TRAINING PROGRAM

## 2024-04-20 PROCEDURE — 99215 OFFICE O/P EST HI 40 MIN: CPT | Performed by: STUDENT IN AN ORGANIZED HEALTH CARE EDUCATION/TRAINING PROGRAM

## 2024-04-20 RX ORDER — ROSUVASTATIN CALCIUM 20 MG/1
20 TABLET, COATED ORAL NIGHTLY
Qty: 90 TABLET | Refills: 9 | Status: SHIPPED | OUTPATIENT
Start: 2024-04-20

## 2024-04-20 RX ORDER — SEMAGLUTIDE 0.68 MG/ML
0.5 INJECTION, SOLUTION SUBCUTANEOUS WEEKLY
Qty: 9 ML | Refills: 0 | Status: SHIPPED | OUTPATIENT
Start: 2024-04-20 | End: 2024-07-07

## 2024-04-20 NOTE — PATIENT INSTRUCTIONS
Cómo controlar la presión arterial renuka  La presión arterial renuka (hipertensión) se conoce también ailin el asesino silencioso. Se la llama así porque muchas personas la tienen sin saberlo. Puede ser muy peligrosa. La presión arterial renuka puede aumentar el riesgo de ataques al corazón, derrames cerebrales, enfermedades del corazón o insuficiencia cardíaca. Controlar la presión arterial puede disminuir el riesgo de tener estos problemas. Es importantecontrolar la presión arterial de manera periódica. Sarben puede salvarle la ezekiel.   Las mediciones de la presión arterial se indican con 2 números. La presión arterial sistólica es el número superior. Es la presión cuando el corazón se contrae. La presión arterial diastólica es el número inferior. Es la presión cuando el corazón se relaja entrelatidos.   La presión arterial se agrupa de la siguiente manera:  Presión arterial normal. Se llama de esta manera cuando la presión sistólica es xochitl que 120 y la diastólica es xochitl que 80 (120/80).  Presión arterial elevada. Se llama de esta manera cuando la presión sistólica es de 120 a 129 y la diastólica es xochitl que 80.  Presión arterial renuka de etapa 1. La presión sistólica está entre 130 y 139 o la diastólica está entre 80 y 89.  Presión arterial renuka de etapa 2. La presión sistólica es 140 o superior o la diastólica es 90 o superior.  Un estilo de ezekiel saludable para el corazón puede ayudarlo a controlar kahn presión arterial sin tener que colleen medicamentos. Más abajo encontrará algunasrecomendaciones a fin de llevar un estilo de ezekiel saludable para el corazón.     Coma alimentos saludables para el corazón  Elija alimentos con bajo contenido de sodio y grasas. Limite la ingesta de sodio a 2,300 mg diarios o según la cantidad que le indique el proveedor de atención médica.  Limite el consumo de comidas enlatadas, secas, curadas, envasadas y las comidas rápidas. Pueden contener mucha sal.  Consuma entre ocho y mehrdad porciones  de frutas y verduras todos los rene.  Elija akhil de res magras, pescado o phyllis.  Coma pasta y arroz integrales y frijoles.  Consuma entre dos y kristi porciones de productos lácteos descremados o bajos en grasa.  Consulte con kahn médico acerca del plan de alimentación DASH. Randee plan ayuda a reducir la presión arterial.  Cuando coma en un restaurante, pida que le preparen los platos sin sal.    Mantenga un peso saludable  Pregunte a kahn proveedor de atención médica cuántas calorías puede consumir por día. Respete sade cantidad.  Pregunte al proveedor cuáles son los límites de peso más adecuados para usted. Si tiene sobrepeso, perder entre un 3 % y un 5 % de kahn peso corporal puede ayudarlo a disminuir kahn presión arterial. Un buen objetivo es perder un 10 % de kahn peso corporal en un año.  Limite el consumo de tentempiés y dulces.  Erma ejercicio con regularidad.    Erma más actividad física  Busque actividades que le gusten. Puede hacerlas de forma individual, o con amigos o kahn rosales. Pruebe andar en bicicleta, bailar, caminar o trotar.  Estacione lejos de las entradas de los edificios para caminar más.  Use las escaleras en lugar del ascensor.  Cuando pueda, camine o vaya en bicicleta en vez de ir en automóvil.  [place] hojas, trabaje en kahn jardín o erma reparaciones en la casa.  Erma actividad física de moderada a intensa judi, al menos, 30 minutos por día, un mínimo de jefferson días a la semana.     Controle el estrés  Tómese tiempo para relajarse y disfrutar de la ezekiel. Encuentre tiempo para reírse.  Comparta allison preocupaciones con allison seres queridos y con el proveedor de atención médica.  Visite a allison familiares y amigos, y mantenga allison pasatiempos favoritos.    Limite el alcohol y deje de fumar  Los hombres no deberían colleen más de dos bebidas alcohólicas por día.  Las mujeres no deberían colleen más de rivera bebida alcohólica por día.  Si fuma, elabore un plan para dejar de hacerlo. Pídale ayuda a kahn proveedor de  atención médica. Si fuma mucho, aumenta el riesgo de padecer enfermedades del corazón y derrames cerebrales. Consulte con el proveedor sobre programas para dejar de fumar y otros recursos de apoyo.    Medicamentos para la presión arterial  Si los cambios en el estilo de ezekiel no son suficientes, el proveedor de atención médica puede recetarle medicamentos para la presión arterial denice. [place] todos los medicamentos según le indiquen. Si tiene alguna pregunta sobre los medicamentos, consulte al proveedor antes de dejar de tomarlos ocambiarlos.     © 2000-2022 [employer]. Todos los derechos reservados. Esta información no pretende sustituir la atención médica profesional. Sólo sumédico puede diagnosticar y tratar un problema de kalyan.    Video Vaultive  ¿Qué es Presión Arterial Denice?  Entender que es Presión Arterial, los riesgos en la kalyan al tener Presión Arterial Denice, los factores que lo ponen a usted en riesgo de tener Presión Arterial [place] y la importancia de trabajar con kahn proveedor del cuidado de la kalyan para controlarla.  Para long el video:  Escanee el código code  Utilizando kahn dispositivo móvil, escanee el siguiente código:  OR  Apurva al sitio web:  [email or web address]  Ingrese el código de receta:  3414S    © 2000-2022 [employer]. All rights reserved. This information is not intended as a substitute for professional medical care. Always follow your healthcare professional's instructions.    Video Vault[employer]  [place] kim con presión arterial elevada  Ciertos alimentos pueden hacer que kahn presión suba demasiado. Fabian y aprenda lo fácil que es disfrutar de comidas deliciosas sin dañar kahn kalyan.     Para long el video:  Escanee el código QR  Utilizando kahn dispositivo móvil, escanee el siguiente código:  CHARLINE Mixon al [employer] web:  [email or web address]  Ingrese el código de receta:   DPX    © 0630-6481 [employer]. All rights reserved. This information is not  intended as a substitute for professional medical care. Always follow your healthcare professional's instructions.    Cómo tomarse la presión arterial  La presión arterial es la fuerza que la josefina ejerce contra las berger de los vasos al desplazarse por ellos. Usted puede tomarse kahn propia presión arterial con un medidor digital. Tómese la presión arterial a la misma hora y en elmismo brazo, tan a menudo ailin le indique kahn proveedor de atención médica.   Acerca de los monitores de presión arterial   Los monitores de presión arterial están diseñados para diferentes edades y diferentes casos. Usted puede encontrar monitores para adultos mayores, para mujeres embarazadas y para niños. Asegúrese de que el que escoja es el adecuadopara kahn edad y situación.   La American [employer] Association recomienda un monitor automatizado con manguito que se adapte al tamaño de kahn brazo (biceps). Kahn brazo debe caber kim dentro del manguito, ya que si es muy hasmukh o muy pequeño no suministrará rivera lectura exacta. Mídase el grosor de kahn barazo paraencontrar la talla correcta para usted.   Los monitores que se ahieren a un dedo o a la mae no son tan precisos comolos que se adhieren a kahn brazo.   Pídale a kahn proveedor de atención médica que le ayude a escoger un monitor. Traiga el monitor a kahn próxima eleazar médica si necesita ayuda para aprender ausarlo correctamente.   Los pasos que se dan a continuación son instrucciones generales para usar unmonitor digital automatizado.     1. Relájese  Tómese la presión arterial a la misma hora todos los días, ailin temprano en la mañana o al final de la tarde.  Espere al menos rivera hora después de julio fumado, comido o hecho ejercicio. No florian café, té, soda u otras bebidas cafeínadas antes de tomarse la presión.  Siéntese cómodamente a la lopez. Coloque el medidor digital cerca de usted.  Descanse judi algunos minutos antes de empezar.    2. Coloque el manguito  Ponga el brazo sobre la lopez,  con la toscano de la mano hacia arriba. El brazo debe quedar a la altura del corazón. Ajuste el manguito alrededor del brazo, juan por encima del codo. Es mejor colocarlo sobre la piel desnuda, sin ropa. La mayoría de los manguitos le indicarán donde debe alinearlo con la arteria braquial (el vaso sanguíneo en la mitad del brazo en la parte interna del codo). Bernie las instrucciones que vienen con kahn monitor para long rivera ilustración. También puede llevar kahn manguito a la eleazar con kahn proveedor de atención médica para que le muestren cómo usarlo correctamente.    3. Infle el manguito  Oprima el botón para iniciar el bombeo automático.  El manguito carter se aprieta y luego se afloja.  Los números cambian. Cuando paran de cambiar, el medidor indicará kahn presión arterial.  Tómese 2 o 3 lecturas con un minuto de diferencia entre rivera y otra.      4. Anote los resultados  [place] nota de los números de kahn presión arterial, así ailin de la fecha y la hora de la medición. Mantenga los resultados en un solo lugar, por ejemplo en un cuaderno. Incluso si kahn monitor tiene rivera memoria incorporada, mantenga rivera copia por escrito de los resultados.  Quite el manguito del brazo y apague la máquina.  Traiga los resultados de allison lecturas a cada rivera de las citas con kahn proveedor de atención médica. También anote el días si cambió la dosis de kahn medicamento. [place] inforamación debe incuirse en los registros que erma de kahn presión arterial para que le ayude a kahn proveedor de atención médica a evaluar qué tan kim están funcionando los cambios en el medicamento.  Pregúntele a kahn proveedor de atención médica a qué niveles debe obtener ayuda de inmediato.      © 5598-3117 [employer]. Todos los derechos reservados. Esta información no pretende sustituir la atención médica profesional. Sólo sumédico puede diagnosticar y tratar un problema de kalyan.

## 2024-04-20 NOTE — PROGRESS NOTES
OFFICE NOTE     Patient ID: Dawson Ibarra is a 48 year old male.  Today's Date: 04/20/24  Chief Complaint: Follow - Up (DM)     ID [id number] (Dawson)     Pt is a pleasant 49y/o French speaking male with PMHx DM2, HLD NAFLD, Coronary and peripheral artherosclerosis and left lower lobe nodule 7-8mm whom presents whom presents to clinic today for Diabetes follow up. Last A1c value was 5.8% done 4/20/2024. Pt eliminated alcohol and soda and feeling better no hypoglycemic episodes. No CP, SOB, palpitations, N/V, changes in bladder or bowel movements.   Pt bp elevated today but did have coffee earlier this morning.     A1C 5.8   Current regiment: metformin 1,000mg po BID   Ozempic 5mg weekly, and 10unit tresibe        Vitals:    04/20/24 0806 04/20/24 0830   BP: 137/84 136/83   Pulse: 82    Weight: 141 lb (64 kg)      body mass index is 23.46 kg/m².  BP Readings from Last 3 Encounters:   04/20/24 136/83   03/11/24 133/77   02/24/24 119/73     The ASCVD Risk score (Laureano DK, et al., 2019) failed to calculate for the following reasons:    The valid total cholesterol range is 130 to 320 mg/dL      Medications reviewed:  Current Outpatient Medications   Medication Sig Dispense Refill    Aspirin 81 MG Oral Cap Take 1 tablet by mouth daily. 365 capsule 0    rosuvastatin 20 MG Oral Tab Take 1 tablet (20 mg total) by mouth nightly. FOR CHOLESTEROL. 90 tablet 9    semaglutide (OZEMPIC, 0.25 OR 0.5 MG/DOSE,) 2 MG/3ML Subcutaneous Solution Pen-injector Inject 0.5 mg into the skin once a week for 12 doses. 9 mL 0    metFORMIN 500 MG Oral Tab Take 2 tablets (1,000 mg total) by mouth 2 (two) times daily with meals. 720 tablet 0    Cholecalciferol (VITAMIN D) 125 MCG (5000 UT) Oral Cap Take 1 capsule (5,000 Units total) by mouth daily. 90 capsule 3    Ferrous Sulfate 325 (65 Fe) MG Oral Tab Take 1 tablet (325 mg total) by mouth daily with breakfast. 90 tablet 3    Continuous Blood Gluc Sensor (DEXCOM G7 SENSOR)  Does not apply Misc 1 each Every 10 days. Use as directed every 10 days 9 each 1    OneTouch Delica Lancets 33G Does not apply Misc Inject 1 Lancet as directed in the morning and 1 Lancet before bedtime. (Patient not taking: Reported on 3/11/2024) 200 each 2    Insulin Pen Needle (PEN NEEDLES) 32G X 4 MM Does not apply Misc 1 each 4 (four) times daily before meals and nightly. (Patient not taking: Reported on 3/11/2024) 400 each 9         Assessment & Plan    1. Type 2 diabetes mellitus with other specified complication, unspecified whether long term insulin use (HCC) (Primary)  -     POC Glycohemoglobin [72511]  -     Diabetic Retinopathy Exam; Future; Expected date: 04/20/2024  -     Ophthalmology Referral - In Network  -     Ozempic (0.25 or 0.5 MG/DOSE); Inject 0.5 mg into the skin once a week for 12 doses.  Dispense: 9 mL; Refill: 0  -     metFORMIN HCl; Take 2 tablets (1,000 mg total) by mouth 2 (two) times daily with meals.  Dispense: 720 tablet; Refill: 0  Pt with initial A1C of 12.7 2 months ago, 1 month ago 9.1 and today is 5.8 with lifestyle changes, on metformin, insulin and ozempic  Plan;  -  2. Dyslipidemia  -     Rosuvastatin Calcium; Take 1 tablet (20 mg total) by mouth nightly. FOR CHOLESTEROL.  Dispense: 90 tablet; Refill: 9  Pt with DM and dyslipidemia and artheroscloeritc disease seen on CT in jan 2024  Plan;  -obtain dedicated Calcium CT scan for risk stratification of mild/mod/severe  -refill resouvastatin 20mg nightly daily, and start aspirin 81mg daily for cardioprotective effect  3. Coronary artery disease due to lipid rich plaque  -     Aspirin; Take 1 tablet by mouth daily.  Dispense: 365 capsule; Refill: 0  -     Rosuvastatin Calcium; Take 1 tablet (20 mg total) by mouth nightly. FOR CHOLESTEROL.  Dispense: 90 tablet; Refill: 9  Pt with DM and dyslipidemia and artheroscloeritc disease seen on CT in jan 2024  Plan;  -obtain dedicated Calcium CT scan for risk stratification of  mild/mod/severe  -refill resouvastatin 20mg nightly daily, and start aspirin 81mg daily for cardioprotective effect  4. Iron deficiency anemia, unspecified iron deficiency anemia type  Pt with iron def anemia  Plan;  -continue ferrous sulfate 325mg po daily    5. Pulmonary nodule, left  -     Pulmonary Referral - In Network  Pt is a Non-smoker,CT chest  Noncalcified 7-8 mm triangular subpleural nodule in the left lower lobe   Plan;  -due for repeat CT scan imaging within the next 3-9 months  -refer to pulmonology for evaluation and repeat imaging and serial screening    6. Encounter for screening colonoscopy  -     Cancel: Gastro Referral - In Network  -     Gastro Referral - In Network  Pt overdue for screening colonoscopy and CT imaging sows colonc diverticulosis, and did not make appointment yet  Plan;  -Educated pt now he has coverage for 6 months to get colonoscopy soon and if reflux recurs (now asymptomatic) should discuss if needs EGD  -follow up with GI     7. Elevated blood pressure reading  2 readings mildly elevated, pt did have coffee this am  Plan;  -education provided to patient of lifestyle changes (printed in Turkish)  .-follow up in 3 months, if still elevated above target >130/80 will start losartan    Follow Up: As needed/if symptoms worsen or Return in about 3 months (around 7/20/2024) for Diabetes, BP follow up..     I spent 42 minutes obtaining pertitent medical history, reviewing pertinent imaging/labs and specialists notes, evaluating patient, discussing differential diagnosis' and various treatment options, reinforcing importance of compliance with treatment plan, and completing documentation.      Objective/ Results:   Physical Exam  Vitals reviewed.   Constitutional:       Appearance: He is well-developed.   HENT:      Head: Normocephalic and atraumatic.   Eyes:      Extraocular Movements: Extraocular movements intact.      Pupils: Pupils are equal, round, and reactive to light.    Cardiovascular:      Rate and Rhythm: Normal rate and regular rhythm.      Heart sounds: Normal heart sounds.   Pulmonary:      Effort: Pulmonary effort is normal.      Breath sounds: Normal breath sounds.   Abdominal:      General: Bowel sounds are normal.      Palpations: Abdomen is soft.      Tenderness: There is no abdominal tenderness.   Musculoskeletal:         General: Normal range of motion.   Feet:      Right foot:      Protective Sensation: 5 sites tested.  5 sites sensed.      Skin integrity: Skin integrity normal.      Left foot:      Protective Sensation: 5 sites tested.  5 sites sensed.      Skin integrity: Skin integrity normal.      Comments: Bilateral barefoot skin diabetic exam is normal, visualized feet and the appearance is normal.  Bilateral monofilament/sensation of both feet is normal.  Pulsation pedal pulse exam of both lower legs/feet is normal as well.  Skin:     General: Skin is warm and dry.   Neurological:      Mental Status: He is alert and oriented to person, place, and time.      Deep Tendon Reflexes: Reflexes are normal and symmetric.        Reviewed:    There are no problems to display for this patient.     No Known Allergies     Social History     Socioeconomic History    Marital status:    Tobacco Use    Smoking status: Never     Passive exposure: Never    Smokeless tobacco: Never   Vaping Use    Vaping status: Never Used   Substance and Sexual Activity    Alcohol use: Yes     Comment: social    Drug use: Never      Review of Systems   Constitutional: Negative.    Respiratory: Negative.     Cardiovascular: Negative.    Gastrointestinal: Negative.    Skin: Negative.    Neurological: Negative.      All other systems negative unless otherwise stated in ROS or HPI above.       Santo Graham MD  Internal Medicine       Call office with any questions or seek emergency care if necessary.   Patient understands and agrees to follow directions and  advice.      ----------------------------------------- PATIENT INSTRUCTIONS-----------------------------------------     Patient Instructions   Cómo controlar la presión arterial renuka  La presión arterial renuka (hipertensión) se conoce también ailin el asesino silencioso. Se la llama así porque muchas personas la tienen sin saberlo. Puede ser muy peligrosa. La presión arterial renuka puede aumentar el riesgo de ataques al corazón, derrames cerebrales, enfermedades del corazón o insuficiencia cardíaca. Controlar la presión arterial puede disminuir el riesgo de tener estos problemas. Es importantecontrolar la presión arterial de manera periódica. [place] puede salvarle la ezekiel.   Las mediciones de la presión arterial se indican con 2 números. La presión arterial sistólica es el número superior. Es la presión cuando el corazón se contrae. La presión arterial diastólica es el número inferior. Es la presión cuando el corazón se relaja entrelatidos.   La presión arterial se agrupa de la siguiente manera:  Presión arterial normal. Se llama de esta manera cuando la presión sistólica es xochitl que 120 y la diastólica es xochitl que 80 (120/80).  Presión arterial elevada. Se llama de esta manera cuando la presión sistólica es de 120 a 129 y la diastólica es xochitl que 80.  Presión arterial renuka de etapa 1. La presión sistólica está entre 130 y 139 o la diastólica está entre 80 y 89.  Presión arterial renuka de etapa 2. La presión sistólica es 140 o superior o la diastólica es 90 o superior.  Un estilo de ezekiel saludable para el corazón puede ayudarlo a controlar kahn presión arterial sin tener que colleen medicamentos. Más abajo encontrará algunasrecomendaciones a fin de llevar un estilo de ezekiel saludable para el corazón.     Coma alimentos saludables para el corazón  Elija alimentos con bajo contenido de sodio y grasas. Limite la ingesta de sodio a 2,300 mg diarios o según la cantidad que le indique el proveedor de atención médica.  Limite el  consumo de comidas enlatadas, secas, curadas, envasadas y las comidas rápidas. Pueden contener mucha sal.  Consuma entre ocho y mehrdad porciones de frutas y verduras todos los rene.  Elija akhil de res magras, pescado o phyllis.  Coma pasta y arroz integrales y frijoles.  Consuma entre dos y kristi porciones de productos lácteos descremados o bajos en grasa.  Consulte con kahn médico acerca del plan de alimentación DASH. [place] plan ayuda a reducir la presión arterial.  Cuando coma en un restaurante, pida que le preparen los platos sin sal.    Mantenga un peso saludable  Pregunte a kahn proveedor de atención médica cuántas calorías puede consumir por día. Respete sade cantidad.  Pregunte al proveedor cuáles son los límites de peso más adecuados para usted. Si tiene sobrepeso, perder entre un 3 % y un 5 % de kahn peso corporal puede ayudarlo a disminuir kahn presión arterial. Un buen objetivo es perder un 10 % de kahn peso corporal en un año.  Limite el consumo de tentempiés y dulces.  Erma ejercicio con regularidad.    Erma más actividad física  Busque actividades que le gusten. Puede hacerlas de forma individual, o con amigos o kahn rosales. Pruebe andar en bicicleta, bailar, caminar o trotar.  Estacione lejos de las entradas de los edificios para caminar más.  Use las escaleras en lugar del ascensor.  Cuando pueda, camine o vaya en bicicleta en vez de ir en automóvil.  [place] hojas, trabaje en kahn jardín o erma reparaciones en la casa.  Erma actividad física de moderada a intensa judi, al menos, 30 minutos por día, un mínimo de jefferson días a la semana.     Controle el estrés  Tómese tiempo para relajarse y disfrutar de la ezekiel. Encuentre tiempo para reírse.  Comparta allison preocupaciones con allison seres queridos y con el proveedor de atención médica.  Visite a allison familiares y amigos, y mantenga allison pasatiempos favoritos.    Limite el alcohol y deje de fumar  Los hombres no deberían colleen más de dos bebidas alcohólicas por día.  Las  mujeres no deberían colleen más de rivera bebida alcohólica por día.  Si fuma, elabore un plan para dejar de hacerlo. [place] ayuda a kahn proveedor de atención médica. Si fuma mucho, aumenta el riesgo de padecer enfermedades del corazón y derrames cerebrales. Consulte con el proveedor sobre programas para dejar de fumar y otros recursos de apoyo.    Medicamentos para la presión arterial  Si los cambios en el estilo de ezekiel no son suficientes, el proveedor de atención médica puede recetarle medicamentos para la presión arterial denice. [place] todos los medicamentos según le indiquen. Si tiene alguna pregunta sobre los medicamentos, consulte al proveedor antes de dejar de tomarlos ocambiarlos.     © 2000-2022 [employer]. Todos los derechos reservados. Esta información no pretende sustituir la atención médica profesional. Sólo sumédico puede diagnosticar y tratar un problema de kalyan.    Video [employer]  ¿Qué es Presión Arterial Denice?  Entender que es Presión Arterial, los riesgos en la kalyan al tener Presión Arterial [place], los factores que lo ponen a usted en riesgo de tener Presión Arterial Denice y la importancia de trabajar con kahn proveedor del cuidado de la kalyan para controlarla.  Para long el video:  Escanee el código code  Utilizando kahn dispositivo móvil, escanee el siguiente código:  OR  Apurva al sitio web:  [employer]  Ingrese el código de receta:  3414S    © 2000-2022 [employer]. All rights reserved. This information is not intended as a substitute for professional medical care. Always follow your healthcare professional's instructions.    Video [employer]  [place] kim con presión arterial elevada  Ciertos alimentos pueden hacer que kahn presión suba demasiado. Fabian y aprenda lo fácil que es disfrutar de comidas deliciosas sin dañar kahn kalyan.     Para long el video:  Escanee el código QR  Utilizando kahn dispositivo móvil, escanee el siguiente código:  O  Vaya al sitio  web:  www.[employer].com  Ingrese el código de receta:   DPX    © 2000-2022 [employer]. All rights reserved. This information is not intended as a substitute for professional medical care. Always follow your healthcare professional's instructions.    Cómo tomarse la presión arterial  La presión arterial es la fuerza que la josefina ejerce contra las berger de los vasos al desplazarse por ellos. Usted puede tomarse kahn propia presión arterial con un medidor digital. Tómese la presión arterial a la misma hora y en elmismo brazo, tan a menudo ailin le indique kahn proveedor de atención médica.   Acerca de los monitores de presión arterial   Los monitores de presión arterial están diseñados para diferentes edades y diferentes casos. Usted puede encontrar monitores para adultos mayores, para mujeres embarazadas y para niños. Asegúrese de que el que escoja es el adecuadopara kahn edad y situación.   La American Heart Association recomienda un monitor automatizado con manguito que se adapte al tamaño de kahn brazo (biceps). Kahn brazo debe caber kim dentro del manguito, ya que si es muy hasmukh o muy pequeño no suministrará rivera lectura exacta. Mídase el grosor de kahn barazo paraencontrar la talla correcta para usted.   Los monitores que se ahieren a un dedo o a la mae no son tan precisos comolos que se adhieren a kahn brazo.   Pídale a kahn proveedor de atención médica que le ayude a escoger un monitor. Traiga el monitor a kahn próxima eleazar médica si necesita ayuda para aprender ausarlo correctamente.   Los pasos que se dan a continuación son instrucciones generales para usar unmonitor digital automatizado.     1. Relájese  Tómese la presión arterial a la misma hora todos los días, ailin temprano en la mañana o al final de la tarde.  Espere al menos rivera hora después de julio fumado, comido o hecho ejercicio. No florian café, té, soda u otras bebidas cafeínadas antes de tomarse la presión.  Siéntese cómodamente a la lopez.  Coloque el medidor digital cerca de usted.  Descanse judi algunos minutos antes de empezar.    2. Coloque el manguito  Ponga el brazo sobre la lopez, con la toscano de la mano hacia arriba. El brazo debe quedar a la altura del corazón. Ajuste el manguito alrededor del brazo, juan por encima del codo. Es mejor colocarlo sobre la piel desnuda, sin ropa. La mayoría de los manguitos le indicarán donde debe alinearlo con la arteria braquial (el vaso sanguíneo en la mitad del brazo en la parte interna del codo). Bernie las instrucciones que vienen con kahn monitor para long rivera ilustración. También puede llevar kahn manguito a la eleazar con kahn proveedor de atención médica para que le muestren cómo usarlo correctamente.    3. Infle el manguito  Oprima el botón para iniciar el bombeo automático.  El manguito carter se aprieta y luego se afloja.  Los números cambian. Cuando paran de cambiar, el medidor indicará kahn presión arterial.  Tómese 2 o 3 lecturas con un minuto de diferencia entre rivera y otra.      4. Anote los resultados  [place] nota de los números de kahn presión arterial, así ailin de la fecha y la hora de la medición. Mantenga los resultados en un solo lugar, por ejemplo en un cuaderno. Incluso si kahn monitor tiene rivera memoria incorporada, mantenga rivera copia por escrito de los resultados.  Quite el manguito del brazo y apague la máquina.  Traiga los resultados de allison lecturas a cada rivera de las citas con kahn proveedor de atención médica. También anote el días si cambió la dosis de kahn medicamento. [place] inforamación debe incuirse en los registros que erma de kahn presión arterial para que le ayude a kahn proveedor de atención médica a evaluar qué tan kim están funcionando los cambios en el medicamento.  Pregúntele a kahn proveedor de atención médica a qué niveles debe obtener ayuda de inmediato.      © 3498-8650 [employer]. Todos los derechos reservados. Esta información no pretende sustituir la atención médica  profesional. Sólo sumédico puede diagnosticar y tratar un problema de kalyan.

## 2024-04-22 ENCOUNTER — TELEPHONE (OUTPATIENT)
Dept: INTERNAL MEDICINE CLINIC | Facility: CLINIC | Age: 49
End: 2024-04-22

## 2024-04-22 NOTE — TELEPHONE ENCOUNTER
I called S[facility]a and the Tresiba refills were cancelled.      Per 4/20/24 notes  - Ozempic (0.25 or 0.5 MG/DOSE); Inject 0.5 mg into the skin once a week for 12 doses. Dispense: 9 mL; Refill: 0       Response to cancel request received from pharmacy.  Received: 2 days ago  [facility]  [facility] Refills  The pharmacy received the electronic cancel request, but could not cancel the prescription. Additional follow up tasks may be necessary based on the pharmacy response noted below.    Pharmacy Note: Unable to Cancel Rx. Please contact Pharmacy          Pharmacy    [id number] - [facility] [address] AT [facility], [phone], [phone]   [address] [facility] [address]   Phone: [phone] Fax: [phone]   Hours: Not open 24 hours     Outpatient Medication Detail     Disp Refills Start End    insulin degludec (TRESIBA) 100 unit/mL Subcutaneous Solution (Discontinued) 9 mL 1 3/11/2024 4/20/2024    Sig - Route: Inject 10 Units into the skin daily. - Subcutaneous    Sent to pharmacy as: Insulin Degludec 100 UNIT/ML Subcutaneous Solution (Tresiba)    Notes to Pharmacy: Other covered/alternative long acting insulin such as glargine/lantus are acceptable alternatives.    Reason for Discontinue: Dosage Changed, Please See New Prescription     E-Prescribing Status: Receipt confirmed by pharmacy (3/11/2024  1:29 PM CDT)    E-Cancel Status: Request denied by pharmacy (4/20/2024  8:22 AM CDT)       E-Cancel Status Note: Unable to Cancel Rx. Please contact Pharmacy      Order Providers    Prescribing Provider Encounter Provider   Skip Glasgow APRN Isufi, Marvina, APRN      Supervision Information    Encounter Supervising Provider Type of Supervision   Kateryna So MD Collaborating Physician   Order Supervising Provider   Kateryna So MD         Encounter    View Encounter              This Order Has Been Discontinued    Order Status Reason By On    Discontinued Dosage Changed, Please See New Prescription  Santo Graham MD

## 2024-06-17 NOTE — H&P
[facility] - Gastroenterology                                                                                                               Reason for consult: eval    Requesting physician or provider: Santo Graham MD    Chief Complaint   Patient presents with    Colonoscopy Screening       HPI:   Dawson Ibarra is a 49 year old year-old male with history of dm, hld:    he is here today for evaluation prior to cln  He is Faroese speaking and an  was used for today's visit    he moves his bowels daily and without recent change. he denies straining and/or incomplete evacuation.  No diarrhea. he denies brbpr and/or melena.    he denies acid reflux and/or heartburn. he denies dysphagia, odynophagia and/or globus. he denies abdominal pain/epigastric pain. he denies nausea and/or vomiting.  he denies recent change in appetite. Was losing weight - Patient with recently diagnosed type II DM January 2024.     Hgb, wbc, plt normal 2/2024   Ferritin normal 2/2024  Gris 2/2024    Microscopic hematuria  Eval urology - Discussed recommendation for further evaluation with a cystoscopy. Patient would like to repeat another UA in 4 weeks before finalizing decision.     Repeat ua neg - discharged from urloogy    NSAIDS: asa 81 mg  Tobacco: no  Alcohol: occasional  Marijuana: no  Illicit drugs: no    No FH GI malignancy  No fhx pancreatic ca    No history of adverse reaction to sedation  No ETHAN  No anticoagulants  No pacemaker/defibrillator  No pain medications and/or sleep aides    Last colonoscopy: no  Last EGD: no    Wt Readings from Last 6 Encounters:   06/20/24 145 lb (65.8 kg)   04/20/24 141 lb (64 kg)   03/11/24 140 lb (63.5 kg)   02/24/24 139 lb (63 kg)   01/29/24 141 lb (64 kg)   01/29/24 141 lb (64 kg)        History, Medications, Allergies, ROS:      Past Medical History:    Diabetes (HCC)    Hyperlipidemia      Past Surgical History:   Procedure Laterality Date    Hernia surgery       10 years ago      Family Hx:   Family History   Problem Relation Age of Onset    Diabetes Father     Other (infection of foot) Father     Diabetes Mother     Diabetes Sister     Diabetes Brother       Social History:   Social History     Socioeconomic History    Marital status:    Tobacco Use    Smoking status: Never     Passive exposure: Never    Smokeless tobacco: Never   Vaping Use    Vaping status: Never Used   Substance and Sexual Activity    Alcohol use: Yes     Comment: social    Drug use: Never        Medications (Active prior to today's visit):  Current Outpatient Medications   Medication Sig Dispense Refill    rosuvastatin 20 MG Oral Tab Take 1 tablet (20 mg total) by mouth nightly. FOR CHOLESTEROL. 90 tablet 9    semaglutide (OZEMPIC, 0.25 OR 0.5 MG/DOSE,) 2 MG/3ML Subcutaneous Solution Pen-injector Inject 0.5 mg into the skin once a week for 12 doses. 9 mL 0    metFORMIN 500 MG Oral Tab Take 2 tablets (1,000 mg total) by mouth 2 (two) times daily with meals. 720 tablet 0    Continuous Blood Gluc Sensor (DEXCOM G7 SENSOR) Does not apply Misc 1 each Every 10 days. Use as directed every 10 days 9 each 1    Aspirin 81 MG Oral Cap Take 1 tablet by mouth daily. 365 capsule 0    Cholecalciferol (VITAMIN D) 125 MCG (5000 UT) Oral Cap Take 1 capsule (5,000 Units total) by mouth daily. (Patient not taking: Reported on 6/20/2024) 90 capsule 3    Ferrous Sulfate 325 (65 Fe) MG Oral Tab Take 1 tablet (325 mg total) by mouth daily with breakfast. (Patient not taking: Reported on 6/20/2024) 90 tablet 3       Allergies:  No Known Allergies    ROS:   CONSTITUTIONAL: negative for fevers, chills, sweats and weight loss  EYES Negative for red eyes, yellow eyes, changes in vision  HEENT: Negative for dysphagia and hoarseness  RESPIRATORY: Negative for cough and shortness of breath  CARDIOVASCULAR: Negative for chest pain, palpitations  GASTROINTESTINAL: See HPI  GENITOURINARY: Negative for dysuria and  frequency  MUSCULOSKELETAL: Negative for arthralgias and myalgias  NEUROLOGICAL: Negative for dizziness and headaches  BEHAVIOR/PSYCH: Negative for anxiety and poor appetite    PHYSICAL EXAM:   Blood pressure 125/82, pulse 85, height 5' 5\" (1.651 m), weight 145 lb (65.8 kg).    GEN: WD/WN, NAD  HEENT: Supple symmetrical, trachea midline  CV: RRR, the extremities are warm and well perfused   LUNGS: No increased work of breathing  ABDOMEN: No scars, normal bowel sounds, soft, non-tender, non-distended no rebound or guarding, no masses, no hepatomegaly  MSK: No redness, no warmth, no swelling of joints  SKIN: No jaundice, no erythema, no rashes  HEMATOLOGIC: No bleeding, no bruising  NEURO: Alert and interactive, normal gait    Labs/Imaging/Procedures:     Patient's pertinent labs and imaging were reviewed and discussed with patient today.        .  ASSESSMENT/PLAN:   Dawson Ibarra is a 49 year old year-old male with history of dm, hld:    #crc screening  #Gris  He is here today as a referral from his PCP for evaluation prior to undergoing colonoscopy for CRC screening.  Noted gris on lab testing improved with oral iron supplement and has since stopped therapy.  Was having microscopic hematuria, resolved on repeat ua. new dx dm.  He denies red flags such as recent change in bm, brbpr, and/or melena.  He is w/o upper gi complaint.  He denies a FHx GI malignancy.  He has not had a colonoscopy and/or egd. Plan as below.     #hepatic steatosis  Noted on ct a/o 1/1024. Recent lfts normal. No significant etoh use.  Plan as below.    -monitor blood sugar, cholesterol with endocrin/primary care  -healthy bmi  -low-fat diet  -continue to monitor liver function for need for further work-up  -labs    1. Schedule colonoscopy/egd with MAC w/ general pool MD [Diagnosis: crc screening, gris]    2.  bowel prep from pharmacy (split trilyte)    3. Hold semaglutide, oral iron supplement 7 days prior to procedure  Hold metformin  day before and am of procedure    4. Read all bowel prep instructions carefully. Bowel prep instructions can also be found online at:  [email or web address]     5. AVOID seeds, nuts, popcorn, raw fruits and vegetables for 3 days before procedure    6. You MAY need to go for COVID testing 72 hours before procedure. The testing team will call you a few days before your procedure to discuss with you if testing is required. If you are asked to go for COVID testing and do not completed the test, the procedure cannot be performed.     7. If you start any NEW medication after your visit today, please notify us. Certain medications (like iron or weight loss medications) will need to be held before the procedure, or the procedure cannot be performed safely.        Orders This Visit:  No orders of the defined types were placed in this encounter.      Meds This Visit:  Requested Prescriptions      No prescriptions requested or ordered in this encounter       Imaging & Referrals:  None    ENDOSCOPIC RISK BENEFIT DISCUSSION: I described the procedure in great detail with the patient. I discussed the risks and benefits, including but not limited to: bleeding, perforation, infection, anesthesia complications, and even death. Patient will be NPO after midnight and will have a person physically present at time of pick-up to drive patient home. Patient verbalized understanding and agrees to proceed with procedure as planned.    Adele Regan, APRN   6/20/2024        This note was partially prepared using Dragon Medical voice recognition dictation software. As a result, errors may occur. When identified, these errors have been corrected. While every attempt is made to correct errors during dictation, discrepancies may still exist.

## 2024-06-18 ENCOUNTER — TELEPHONE (OUTPATIENT)
Dept: INTERNAL MEDICINE CLINIC | Facility: CLINIC | Age: 49
End: 2024-06-18

## 2024-06-20 ENCOUNTER — LAB ENCOUNTER (OUTPATIENT)
Dept: LAB | Age: 49
End: 2024-06-20
Attending: NURSE PRACTITIONER

## 2024-06-20 ENCOUNTER — TELEPHONE (OUTPATIENT)
Dept: GASTROENTEROLOGY | Facility: CLINIC | Age: 49
End: 2024-06-20

## 2024-06-20 ENCOUNTER — OFFICE VISIT (OUTPATIENT)
Dept: GASTROENTEROLOGY | Facility: CLINIC | Age: 49
End: 2024-06-20

## 2024-06-20 ENCOUNTER — TELEPHONE (OUTPATIENT)
Dept: INTERNAL MEDICINE CLINIC | Facility: CLINIC | Age: 49
End: 2024-06-20

## 2024-06-20 VITALS
SYSTOLIC BLOOD PRESSURE: 125 MMHG | HEIGHT: 65 IN | DIASTOLIC BLOOD PRESSURE: 82 MMHG | BODY MASS INDEX: 24.16 KG/M2 | HEART RATE: 85 BPM | WEIGHT: 145 LBS

## 2024-06-20 DIAGNOSIS — E11.69 TYPE 2 DIABETES MELLITUS WITH OTHER SPECIFIED COMPLICATION, UNSPECIFIED WHETHER LONG TERM INSULIN USE (HCC): ICD-10-CM

## 2024-06-20 DIAGNOSIS — Z12.11 COLON CANCER SCREENING: ICD-10-CM

## 2024-06-20 DIAGNOSIS — Z12.11 COLON CANCER SCREENING: Primary | ICD-10-CM

## 2024-06-20 DIAGNOSIS — K76.0 HEPATIC STEATOSIS: ICD-10-CM

## 2024-06-20 DIAGNOSIS — D50.9 IRON DEFICIENCY ANEMIA, UNSPECIFIED IRON DEFICIENCY ANEMIA TYPE: ICD-10-CM

## 2024-06-20 LAB
BASOPHILS # BLD AUTO: 0.03 X10(3) UL (ref 0–0.2)
BASOPHILS NFR BLD AUTO: 0.4 %
DEPRECATED HBV CORE AB SER IA-ACNC: 22.5 NG/ML
DEPRECATED RDW RBC AUTO: 40.1 FL (ref 35.1–46.3)
EOSINOPHIL # BLD AUTO: 0.14 X10(3) UL (ref 0–0.7)
EOSINOPHIL NFR BLD AUTO: 1.9 %
ERYTHROCYTE [DISTWIDTH] IN BLOOD BY AUTOMATED COUNT: 12.4 % (ref 11–15)
HCT VFR BLD AUTO: 40.9 %
HGB BLD-MCNC: 14 G/DL
IMM GRANULOCYTES # BLD AUTO: 0.02 X10(3) UL (ref 0–1)
IMM GRANULOCYTES NFR BLD: 0.3 %
IRON SATN MFR SERPL: 19 %
IRON SERPL-MCNC: 60 UG/DL
LYMPHOCYTES # BLD AUTO: 1.42 X10(3) UL (ref 1–4)
LYMPHOCYTES NFR BLD AUTO: 19 %
MCH RBC QN AUTO: 30.2 PG (ref 26–34)
MCHC RBC AUTO-ENTMCNC: 34.2 G/DL (ref 31–37)
MCV RBC AUTO: 88.1 FL
MONOCYTES # BLD AUTO: 0.51 X10(3) UL (ref 0.1–1)
MONOCYTES NFR BLD AUTO: 6.8 %
NEUTROPHILS # BLD AUTO: 5.36 X10 (3) UL (ref 1.5–7.7)
NEUTROPHILS # BLD AUTO: 5.36 X10(3) UL (ref 1.5–7.7)
NEUTROPHILS NFR BLD AUTO: 71.6 %
PLATELET # BLD AUTO: 169 10(3)UL (ref 150–450)
RBC # BLD AUTO: 4.64 X10(6)UL
TIBC SERPL-MCNC: 314 UG/DL (ref 250–425)
TRANSFERRIN SERPL-MCNC: 211 MG/DL (ref 215–365)
WBC # BLD AUTO: 7.5 X10(3) UL (ref 4–11)

## 2024-06-20 PROCEDURE — 82728 ASSAY OF FERRITIN: CPT

## 2024-06-20 PROCEDURE — S0285 CNSLT BEFORE SCREEN COLONOSC: HCPCS | Performed by: NURSE PRACTITIONER

## 2024-06-20 PROCEDURE — 84466 ASSAY OF TRANSFERRIN: CPT

## 2024-06-20 PROCEDURE — 83540 ASSAY OF IRON: CPT

## 2024-06-20 PROCEDURE — 85025 COMPLETE CBC W/AUTO DIFF WBC: CPT

## 2024-06-20 PROCEDURE — 36415 COLL VENOUS BLD VENIPUNCTURE: CPT

## 2024-06-20 RX ORDER — POLYETHYLENE GLYCOL 3350, SODIUM CHLORIDE, SODIUM BICARBONATE, POTASSIUM CHLORIDE 420; 11.2; 5.72; 1.48 G/4L; G/4L; G/4L; G/4L
POWDER, FOR SOLUTION ORAL
Qty: 4000 ML | Refills: 0 | Status: SHIPPED | OUTPATIENT
Start: 2024-06-20

## 2024-06-20 RX ORDER — SEMAGLUTIDE 0.68 MG/ML
0.5 INJECTION, SOLUTION SUBCUTANEOUS WEEKLY
Qty: 9 ML | Refills: 0 | Status: SHIPPED | OUTPATIENT
Start: 2024-06-20 | End: 2024-09-06

## 2024-06-20 NOTE — PATIENT INSTRUCTIONS
-monitor blood sugar, cholesterol with endocrin/primary care  -healthy bmi  -low-fat diet  -continue to monitor liver function for need for further work-up  -labs    1. Schedule colonoscopy/egd with MAC w/ general pool MD [Diagnosis: crc screening, priscilla]    2.  bowel prep from pharmacy (split trilyte)    3. Hold semaglutide, oral iron supplement 7 days prior to procedure  Hold metformin day before and am of procedure    4. Read all bowel prep instructions carefully. Bowel prep instructions can also be found online at:  [email or web address]     5. AVOID seeds, nuts, popcorn, raw fruits and vegetables for 3 days before procedure    6. You MAY need to go for COVID testing 72 hours before procedure. The testing team will call you a few days before your procedure to discuss with you if testing is required. If you are asked to go for COVID testing and do not completed the test, the procedure cannot be performed.     7. If you start any NEW medication after your visit today, please notify us. Certain medications (like iron or weight loss medications) will need to be held before the procedure, or the procedure cannot be performed safely.

## 2024-06-20 NOTE — TELEPHONE ENCOUNTER
Scheduled for: colonoscopy 65103/EGD 48594  Provider Name: Dr Irizarry  Date: Wed 10/16/2024  Location: [facility]  Sedation: MAC   Time: 7:30 am   Prep: split trilyte   Meds/Allergies Reconciled?: NKDA    Diagnosis with codes: colon screening Z12.11, anemia D50.9  Was patient informed to call insurance with codes (Y/N): Yes   Referral sent?: Yes, Financial assistance/[employer]   [facility] or [facility] notified?: I sent an electronic request to Endo Scheduling and received a confirmation today.     Medication Orders: Hold semaglutide, oral iron supplement 7 days prior to procedure  Hold metformin day before and am of procedure.  Patient is aware to NOT take iron pills, herbal meds and diet supplements for 7 days before exam. Also to NOT take any form of alcohol, recreational drugs and any forms of ED meds 24-72 hours before exam.      Misc Orders:  Financial assistance expires on 8/13/2024     Further instructions given by staff: Instructions given in office and pt verbalized understanding        1. Schedule colonoscopy/egd with MAC w/ general pool MD [Diagnosis: crc screening, priscilla]     2.  bowel prep from pharmacy (split trilyte)     3. Hold semaglutide, oral iron supplement 7 days prior to procedure  Hold metformin day before and am of procedure.

## 2024-06-24 ENCOUNTER — TELEPHONE (OUTPATIENT)
Dept: GASTROENTEROLOGY | Facility: CLINIC | Age: 49
End: 2024-06-24

## 2024-06-24 NOTE — TELEPHONE ENCOUNTER
Labs c/y iron def. Hgb normal.  Pt was contacted using a .  Results were reviewed and recommendation for cln/egd as discussed at tov.     He verbalizes understanding and is in agreement with the plan.

## 2024-10-14 ENCOUNTER — TELEPHONE (OUTPATIENT)
Facility: CLINIC | Age: 49
End: 2024-10-14

## 2024-11-14 ENCOUNTER — TELEPHONE (OUTPATIENT)
Dept: INTERNAL MEDICINE CLINIC | Facility: CLINIC | Age: 49
End: 2024-11-14

## 2024-11-14 DIAGNOSIS — Z12.11 COLON CANCER SCREENING: Primary | ICD-10-CM

## 2024-11-14 NOTE — TELEPHONE ENCOUNTER
Hello Please inform patient they are due for colorectal cancer screening. Given patient is above the age of +45 with average risk, and desire for non-invasive testing, order for Cologuard which detects stool sample for cancer was placed and will be shipped to your home within the next 48 hours and has a return UPS pre-paid postage to send back in the mail. Please complete this within the next week to ensure Dr. Graham is delivering High quality/value care to his patients. If you have any questions, please schedule follow up with him and he would gladly discuss.

## 2024-12-03 LAB — AMB EXT COLOGUARD RESULT: NEGATIVE

## 2024-12-09 ENCOUNTER — MED REC SCAN ONLY (OUTPATIENT)
Dept: INTERNAL MEDICINE CLINIC | Facility: CLINIC | Age: 49
End: 2024-12-09

## 2025-01-15 ENCOUNTER — OFFICE VISIT (OUTPATIENT)
Dept: SURGERY | Facility: CLINIC | Age: 50
End: 2025-01-15

## 2025-01-15 VITALS — BODY MASS INDEX: 24.49 KG/M2 | HEIGHT: 65 IN | WEIGHT: 147 LBS

## 2025-01-15 DIAGNOSIS — K40.30 INCARCERATED LEFT INGUINAL HERNIA: Primary | ICD-10-CM

## 2025-01-15 PROCEDURE — 99204 OFFICE O/P NEW MOD 45 MIN: CPT | Performed by: SURGERY

## 2025-01-15 NOTE — H&P
History and Physical      HPI     Chief Complaint   Patient presents with    Referral     Referral from Dr. Nan Graham for possible Left Inguinal Hernia.  Onset about two years.  Patient reports its most painful after activity.  Patient has normal urine and BM's.         HPI  Dawson Ibarra is a 49 year old male who presents with a left inguinal hernia.  Most symptomatic after activity.  Present for 2 years.    Past Medical History:    Diabetes (HCC)    Hyperlipidemia     Past Surgical History:   Procedure Laterality Date    Hernia surgery      10 years ago     Current Outpatient Medications   Medication Sig Dispense Refill    PEG 3350-KCl-Na Bicarb-NaCl (TRILYTE) 420 g Oral Recon Soln Take prep as directed by gastro office. May substitute with Trilyte/generic equivalent if needed. 4000 mL 0    semaglutide (OZEMPIC, 0.25 OR 0.5 MG/DOSE,) 2 MG/3ML Subcutaneous Solution Pen-injector Inject 0.5 mg into the skin once a week for 12 doses. 9 mL 0    Aspirin 81 MG Oral Cap Take 1 tablet by mouth daily. 365 capsule 0    rosuvastatin 20 MG Oral Tab Take 1 tablet (20 mg total) by mouth nightly. FOR CHOLESTEROL. 90 tablet 9    Continuous Blood Gluc Sensor (DEXCOM G7 SENSOR) Does not apply Misc 1 each Every 10 days. Use as directed every 10 days 9 each 1    Cholecalciferol (VITAMIN D) 125 MCG (5000 UT) Oral Cap Take 1 capsule (5,000 Units total) by mouth daily. (Patient not taking: Reported on 6/20/2024) 90 capsule 3    Ferrous Sulfate 325 (65 Fe) MG Oral Tab Take 1 tablet (325 mg total) by mouth daily with breakfast. (Patient not taking: Reported on 6/20/2024) 90 tablet 3     ALLERGIES  Allergies[1]    Social History     Socioeconomic History    Marital status:    Tobacco Use    Smoking status: Never     Passive exposure: Never    Smokeless tobacco: Never   Vaping Use    Vaping status: Never Used   Substance and Sexual Activity    Alcohol use: Yes     Comment: social    Drug use: Never     Family History   Problem  Relation Age of Onset    Diabetes Father     Other (infection of foot) Father     Diabetes Mother     Diabetes Sister     Diabetes Brother        Review of Systems   A comprehensive 10 point review of systems was completed.  Pertinent positives and negatives noted in the the HPI.    PHYSICAL EXAM   Ht 5' 5\" (1.651 m)   Wt 147 lb (66.7 kg)   BMI 24.46 kg/m²  No LMP for male patient.   Constitutional: appears well hydrated alert and responsive no acute distress noted  Head/Face: normocephalic  Nose/Mouth/Throat: nose and throat are clear palate is intact mucous membranes are moist no oral lesions are noted  Neck/Thyroid: neck is supple without adenopathy  Respiratory: normal to inspection lungs are clear to auscultation bilaterally normal respiratory effort  Cardiovascular: regular rate and rhythm no murmurs, gallups, or rubs  Abdomen: soft non-tender non-distended no organomegaly noted no masses  Left inguinal hernia, recurrence and not reducible  Extremities: no edema, cyanosis, or clubbing  Neurological: exam appropriate for age reflexes and motor skills appropriate for age      ASSESSMENT/PLAN   Assessment       49 year old male with incarcerated recurrent left inguinal hernia  We have discussed the surgical risks, benefits, alternatives, and expected recovery. We will plan robotic repair incarcerated left inguinal hernia with mesh. All of the patient's questions have been answered to his satisfaction.       1/15/2025  Jonh Chang MD               [1] No Known Allergies

## 2025-02-25 NOTE — DISCHARGE INSTRUCTIONS
Ice pack as needed.  May shower in 24 hours.  15 pound lifting restriction for weeks.  May drive in 1 week or sooner if pain-free  Wear boxer briefs or scrotal supporter for 1 week to minimize swelling  Remove outer Band-Aid in 24 hours leave white strips for 1 week  Ibuprofen 600 mg every 6 hours for pain.  Norco for breakthrough pain  Take Colace while taking Norco as a stool softener  Follow-up with PA in 2 weeks    Bolsa de hielo según sea necesario.  Puede ducharse en 24 horas.  Restricción de levantamiento de 15 libras judi semanas.  Puede conducir en 1 semana o antes si no siente dolor.  Use calzoncillos tipo bóxer o soporte escrotal judi 1 semana para minimizar la hinchazón.  Retire la curita exterior en 24 horas y deje las tiras mick judi 1 semana.  Ibuprofeno 600 mg cada 6 horas para el dolor.  Norco para el dolor irruptivo  [place] Colace mientras raul Story ailin ablandador de heces.  Seguimiento con AP en 2 semanas.         CIRUGIA AMBULATORIA: INSTRUCCIONES DESPUES DE MARJAN RECIBIDO ANESTESIA  Debido a la Anestesia y a las medicamentos que se le aplicaron judi la cirugia, allison reflejos y capacidaddiscernimiento pueden verse afectados. Tambien podria tener un poco de mareo.Aparte de siguir las precauciones de sentico comun, le recomendamos lo siguiente:     El paciente debe estar acompañado por alguien hasta la mañana siguiente.     No maneje ningun vehiculo automotor ni monte bicicleta.     No tome ninguna decision importante ailin por ejemplo firmar de documentos importantes.     No opere herramientas electricas ni electrodomesticos, tales ailin cuchillos electricos, batidoras electricas o serruchos electricos. No luis el cesped con cortadoras electricas. No practique deportes.     No erma ejercicio.     Para evitar las nauseas, coma menos de lo normal mas o menos la mitad de lo habitual y/o florian solo liquidos hasta la manana siguiente. Consulte con kahn doctor si esta llevando rivera dieta  especial.     No tome bebidas alcoholicas, tranquilizantes, pastilles para dormir etc., y verifique con kahn doctor acerca de cualquier medicamento que francisco tomando actualmente.     El efecto de la medicación usada en kahn anestesia habra pasado harriet por completo a la medianoche. Por lo tanto, puede reanudar allison habitos cotidianos en la mañana.     Los adultos deben descansar lo nehal posible por las siguientes 24 horas. Los niños deben permanecer en cama lo nehal posible por las siguientes 24 horas.     Si se presenta cualquier problema, puede llamar a kahn propio doctor personal o aceda al centro de Emergencia de [facility].    Si sigue estas instrucciones, se sentira major y estara mas seguro despues de kahn cirugia ambulatoria. Sitiene cualquier pregunta, llame al hospital y pida que lo comuniquen con la enfermera de cirugia [phone], extension [phone].    Instrucciones para el renuka: después de kahn cirugía   Acaba de someterse a rivera cirugía. Martha la cirugía, le administraron un tipo de medicamento llamado anestesia para que esté relajado y no sienta dolor. Después de la cirugía, suhail vez sienta algo de dolor o náuseas. [place] es común. Estos son algunos consejos para sentirse mejor y recuperarse kim después de la cirugía.   El regreso a casa  Kahn proveedor de atención médica le enseñará cómo cuidarse cuando regrese a kahn casa. También responderá allison preguntas. Pida a un familiar o amigo adulto que lo conduzca a kahn casa. Martha las primeras 24 horas después de la cirugía, siga estas recomendaciones:   No conduzca ni use maquinaria pesada.  No tome decisiones importantes ni firme ningún documento legal.  Adminístrese los medicamentos según las indicaciones.  Evite el consumo de alcohol.  Si es necesario, coordine para que alguien se quede con usted. Esta persona puede vigilar cualquier problema que se presente y lo ayudará a permanecer seguro.  Asegúrese de asistir a todas allison visitas de  control con kahn proveedor de atención médica. Y descanse después de la cirugía judi el tiempo que le indique kahn proveedor.   Cómo sobrellevar el dolor  Si siente dolor después de la cirugía, los analgésicos lo ayudarán a sentirse mejor. [place] los analgésicos según las indicaciones, antes de que el dolor se intensifique. Además, pregunte a kahn proveedor de atención médica o al farmacéutico acerca de otras formas de controlar el dolor. Estas podrían incluir aplicar calor o hielo, o hacer ejercicios de relajación. Y siga todas las instrucciones que le dé kahn cirujano o enfermero.      Cumpla el cronograma de allison medicamentos.     Consejos para milan analgésicos  Para aliviar el dolor lo valentin posible, recuerde estos puntos:   Los analgésicos pueden causar malestar estomacal. Tomarlos con un poco de comida puede aliviar randee efecto.  La mayoría de los calmantes que se asim por la boca necesitan por lo menos de 20 a 30 minutos para surtir efecto.  No espere hasta que kahn dolor se vuelva intenso para milan el analgésico que le indicaron. Intente que el momento en que puede milan kahn medicamento coincida con otra actividad. Randee podría ser el momento antes de vestirse, michelle un paseo o sentarse a la lopez para cenar.  El estreñimiento es un efecto secundario frecuente de algunos analgésicos. Consulte a kahn proveedor de atención médica antes de usar cualquier medicamento, ailin laxantes o ablandadores de heces, para ayudar a aliviar el estreñimiento. También consulte si es preciso evitar algún tipo de alimento. [place] mucha cantidad de líquido y comer alimentos ailin frutas y verduras con alto contenido de fibra también puede ser beneficioso. Recuerde que no debe milan laxantes a menos que akhn cirujano se los indique.  Mezclar bebidas alcohólicas y analgésicos puede causar mareos y enlentecer kahn respiración. Y hasta puede ser mortal. No florian alcohol mientras esté tomando calmantes.  Los analgésicos pueden hacer que tenga reacciones  más lentas. No conduzca ni opere maquinaria mientras esté tomando analgésicos.  Kahn proveedor de atención médica puede indicarle que tome acetaminofén (paracetamol) para ayudar a aliviar el dolor. Pregúntele qué cantidad debe colleen por día. El acetaminofén y otros analgésicos pueden interactuar con allison medicamentos recetados u otros medicamentos de venta mauricio (OTC, por allison siglas en inglés). Algunos medicamentos recetados contienen acetaminofén y otros ingredientes. Combinar medicamentos recetados y acetaminofén de venta mauricio para aliviar el dolor puede provocarle rivera sobredosis accidental. Leisa atentamente la etiqueta del envase de allison medicamentos OTC. Atalissa lo ayudará a saber con exactitud la lista de ingredientes, la cantidad que debe colleen y cualquier advertencia. Atalissa también puede ayudarlo a evitar colleen demasiado acetaminofén. Si tiene preguntas o no entiende la información, pídale a kahn farmacéutico o proveedor de atención médica que se la explique antes de colleen el medicamento OTC.   Manejo de las náuseas  Algunas personas pueden sentir malestar estomacal (náuseas) después de la cirugía. Atalissa suele suceder debido a la anestesia, el dolor, los analgésicos, la disminución del movimiento de la comida en el estómago o el estrés de la cirugía. Estos consejos lo ayudarán a manejar las náuseas y a comer alimentos más saludables mientras se recupera. Si seguía un plan alimentario especial antes de la cirugía, pregúntele a kahn proveedor de atención médica si debe continuarlo mientras se recupera. Consulte con kahn proveedor cómo debería continuar kahn alimentación. Esta puede variar según el tipo de cirugía a la que se sometió. Los siguientes consejos generales pueden serle útiles:   No se fuerce a comer. Guíese por kahn cuerpo para saber cuándo comer y qué cantidad.  Comience con líquidos transparentes y sopa. Estos son más fáciles de digerir.  Tan pronto ailin se sienta listo, intente comer alimentos semisólidos. Estos  incluyen puré de dayan, puré de manzana y gelatina.  Lentamente, pase a alimentos sólidos. Al principio no coma alimentos grasosos, pesados ni condimentados.  No se fuerce a hacer kristi comidas grandes al día. En cambio, coma cantidades pequeñas, jose con mayor frecuencia.  [place] los analgésicos con rivera pequeña cantidad de alimentos sólidos, ailin galletas saladas o rivera tostada. [place] ayuda a prevenir las náuseas.  Cuándo llamar a kahn proveedor de atención médica   Llame de inmediato a kahn proveedor de atención médica si nota alguno de los siguientes síntomas:   Sigue teniendo mucho dolor, o el dolor empeora, después de colleen el medicamento. Puede que el medicamento no sea lo suficientemente anali. O kim, puede julio complicaciones de la cirugía.  Se siente demasiado somnoliento, mareado o adormecido. Quizás el medicamento sea demasiado anali.  Tiene efectos secundarios, ailin náuseas o vómitos. Kahn proveedor de atención médica puede recomendarle colleen otros medicamentos.  Tiene cambios en la piel, ailin sarpullido, picazón o urticaria. [place] puede significar que tiene rivera reacción alérgica. Kahn proveedor puede recomendarle colleen otros medicamentos.  La incisión tiene un aspecto diferente (por ejemplo, se abre rivera parte).  Tiene sangrado o supuración de líquido de la herida y no le dijeron que eso era esperable.  Fiebre de 100.4 °F (38 °C) o más, o según le indique kahn proveedor.  Cuándo llamar al 911  Llame al  911  de inmediato si tiene:   Dificultad para respirar  Rhea hinchada    Si tiene apnea del sueño obstructiva   Martha la cirugía, le administraron anestesia para que esté cómodo y no sienta dolor. Después de la cirugía, es probable que tenga más ataques de apnea causados por la anestesia y otros medicamentos que le administraron. Los ataques pueden durar más de lo habitual.    En kahn casa, erma lo siguiente:  Cuando duerma, siga usando kahn dispositivo de presión positiva continua en las vías respiratorias (CPAP, por  allison siglas en inglés). A menos que kahn proveedor de atención médica le indique lo contrario, úselo siempre que duerma, ya sea de día o de noche. El dispositivo de CPAP suele usarse para tratar la apnea obstructiva del sueño.  Consulte a kahn proveedor antes de colleen cualquier analgésico, relajante muscular o sedante. Kahn proveedor le dará información sobre los peligros de colleen estos medicamentos.  Comuníquese con kahn proveedor si tiene el sueño demasiado alterado, incluso cuando esté tomando los medicamentos según las instrucciones.  © 3035-4688 [employer]. Todos los derechos reservados. Esta información no pretende sustituir la atención médica profesional. Sólo kahn médico puede diagnosticar y tratar un problema de kalyan.

## 2025-02-27 ENCOUNTER — ANESTHESIA (OUTPATIENT)
Dept: SURGERY | Facility: HOSPITAL | Age: 50
End: 2025-02-27
Payer: COMMERCIAL

## 2025-02-27 ENCOUNTER — HOSPITAL ENCOUNTER (OUTPATIENT)
Facility: HOSPITAL | Age: 50
Setting detail: HOSPITAL OUTPATIENT SURGERY
Discharge: HOME | End: 2025-02-27
Attending: SURGERY | Admitting: SURGERY
Payer: COMMERCIAL

## 2025-02-27 ENCOUNTER — TELEPHONE (OUTPATIENT)
Dept: SURGERY | Facility: CLINIC | Age: 50
End: 2025-02-27

## 2025-02-27 ENCOUNTER — ANESTHESIA EVENT (OUTPATIENT)
Dept: SURGERY | Facility: HOSPITAL | Age: 50
End: 2025-02-27
Payer: COMMERCIAL

## 2025-02-27 VITALS
TEMPERATURE: 99 F | SYSTOLIC BLOOD PRESSURE: 143 MMHG | HEART RATE: 76 BPM | BODY MASS INDEX: 23.46 KG/M2 | DIASTOLIC BLOOD PRESSURE: 86 MMHG | RESPIRATION RATE: 16 BRPM | OXYGEN SATURATION: 96 % | WEIGHT: 146 LBS | HEIGHT: 66 IN

## 2025-02-27 DIAGNOSIS — K40.90 INDIRECT LEFT INGUINAL HERNIA: Primary | ICD-10-CM

## 2025-02-27 PROBLEM — K40.30 INCARCERATED LEFT INGUINAL HERNIA: Status: ACTIVE | Noted: 2025-02-27

## 2025-02-27 LAB
GLUCOSE BLDC GLUCOMTR-MCNC: 131 MG/DL (ref 70–99)
GLUCOSE BLDC GLUCOMTR-MCNC: 135 MG/DL (ref 70–99)

## 2025-02-27 PROCEDURE — 49651 LAP ING HERNIA REPAIR RECUR: CPT | Performed by: SURGERY

## 2025-02-27 PROCEDURE — 8E0W4CZ ROBOTIC ASSISTED PROCEDURE OF TRUNK REGION, PERCUTANEOUS ENDOSCOPIC APPROACH: ICD-10-PCS | Performed by: SURGERY

## 2025-02-27 PROCEDURE — 0YU64JZ SUPPLEMENT LEFT INGUINAL REGION WITH SYNTHETIC SUBSTITUTE, PERCUTANEOUS ENDOSCOPIC APPROACH: ICD-10-PCS | Performed by: SURGERY

## 2025-02-27 DEVICE — LAPAROSCOPIC SELF-FIXATING MESH POLYESTER WITH POLYLACTIC ACID GRIPS AND COLLAGEN FILM
Type: IMPLANTABLE DEVICE | Site: INGUINAL | Status: FUNCTIONAL
Brand: PROGRIP

## 2025-02-27 RX ORDER — HYDROMORPHONE HYDROCHLORIDE 1 MG/ML
0.6 INJECTION, SOLUTION INTRAMUSCULAR; INTRAVENOUS; SUBCUTANEOUS EVERY 5 MIN PRN
Status: DISCONTINUED | OUTPATIENT
Start: 2025-02-27 | End: 2025-02-27

## 2025-02-27 RX ORDER — BUPIVACAINE HYDROCHLORIDE 2.5 MG/ML
INJECTION, SOLUTION EPIDURAL; INFILTRATION; INTRACAUDAL AS NEEDED
Status: DISCONTINUED | OUTPATIENT
Start: 2025-02-27 | End: 2025-02-27 | Stop reason: HOSPADM

## 2025-02-27 RX ORDER — SODIUM CHLORIDE, SODIUM LACTATE, POTASSIUM CHLORIDE, CALCIUM CHLORIDE 600; 310; 30; 20 MG/100ML; MG/100ML; MG/100ML; MG/100ML
INJECTION, SOLUTION INTRAVENOUS CONTINUOUS
Status: DISCONTINUED | OUTPATIENT
Start: 2025-02-27 | End: 2025-02-27

## 2025-02-27 RX ORDER — ROCURONIUM BROMIDE 10 MG/ML
INJECTION, SOLUTION INTRAVENOUS AS NEEDED
Status: DISCONTINUED | OUTPATIENT
Start: 2025-02-27 | End: 2025-02-27 | Stop reason: SURG

## 2025-02-27 RX ORDER — MIDAZOLAM HYDROCHLORIDE 1 MG/ML
INJECTION INTRAMUSCULAR; INTRAVENOUS AS NEEDED
Status: DISCONTINUED | OUTPATIENT
Start: 2025-02-27 | End: 2025-02-27 | Stop reason: SURG

## 2025-02-27 RX ORDER — MORPHINE SULFATE 4 MG/ML
2 INJECTION, SOLUTION INTRAMUSCULAR; INTRAVENOUS EVERY 10 MIN PRN
Status: DISCONTINUED | OUTPATIENT
Start: 2025-02-27 | End: 2025-02-27

## 2025-02-27 RX ORDER — IBUPROFEN 600 MG/1
600 TABLET, FILM COATED ORAL EVERY 6 HOURS PRN
Qty: 15 TABLET | Refills: 1 | Status: SHIPPED | OUTPATIENT
Start: 2025-02-27 | End: 2025-03-06

## 2025-02-27 RX ORDER — ONDANSETRON 2 MG/ML
INJECTION INTRAMUSCULAR; INTRAVENOUS AS NEEDED
Status: DISCONTINUED | OUTPATIENT
Start: 2025-02-27 | End: 2025-02-27 | Stop reason: SURG

## 2025-02-27 RX ORDER — DEXTROSE MONOHYDRATE 25 G/50ML
50 INJECTION, SOLUTION INTRAVENOUS
Status: DISCONTINUED | OUTPATIENT
Start: 2025-02-27 | End: 2025-02-27

## 2025-02-27 RX ORDER — MORPHINE SULFATE 4 MG/ML
4 INJECTION, SOLUTION INTRAMUSCULAR; INTRAVENOUS EVERY 10 MIN PRN
Status: DISCONTINUED | OUTPATIENT
Start: 2025-02-27 | End: 2025-02-27

## 2025-02-27 RX ORDER — HYDROCODONE BITARTRATE AND ACETAMINOPHEN 5; 325 MG/1; MG/1
1 TABLET ORAL EVERY 6 HOURS PRN
Qty: 20 TABLET | Refills: 0 | Status: SHIPPED | OUTPATIENT
Start: 2025-02-27

## 2025-02-27 RX ORDER — DOCUSATE SODIUM 100 MG/1
100 CAPSULE, LIQUID FILLED ORAL 2 TIMES DAILY
Qty: 30 CAPSULE | Refills: 0 | Status: SHIPPED | OUTPATIENT
Start: 2025-02-27

## 2025-02-27 RX ORDER — ACETAMINOPHEN 500 MG
1000 TABLET ORAL ONCE
Status: COMPLETED | OUTPATIENT
Start: 2025-02-27 | End: 2025-02-27

## 2025-02-27 RX ORDER — HYDROMORPHONE HYDROCHLORIDE 1 MG/ML
0.4 INJECTION, SOLUTION INTRAMUSCULAR; INTRAVENOUS; SUBCUTANEOUS EVERY 5 MIN PRN
Status: DISCONTINUED | OUTPATIENT
Start: 2025-02-27 | End: 2025-02-27

## 2025-02-27 RX ORDER — DEXAMETHASONE SODIUM PHOSPHATE 4 MG/ML
VIAL (ML) INJECTION AS NEEDED
Status: DISCONTINUED | OUTPATIENT
Start: 2025-02-27 | End: 2025-02-27 | Stop reason: SURG

## 2025-02-27 RX ORDER — NICOTINE POLACRILEX 4 MG
30 LOZENGE BUCCAL
Status: DISCONTINUED | OUTPATIENT
Start: 2025-02-27 | End: 2025-02-27

## 2025-02-27 RX ORDER — LIDOCAINE HYDROCHLORIDE 20 MG/ML
INJECTION, SOLUTION EPIDURAL; INFILTRATION; INTRACAUDAL; PERINEURAL AS NEEDED
Status: DISCONTINUED | OUTPATIENT
Start: 2025-02-27 | End: 2025-02-27 | Stop reason: SURG

## 2025-02-27 RX ORDER — KETOROLAC TROMETHAMINE 30 MG/ML
INJECTION, SOLUTION INTRAMUSCULAR; INTRAVENOUS AS NEEDED
Status: DISCONTINUED | OUTPATIENT
Start: 2025-02-27 | End: 2025-02-27 | Stop reason: SURG

## 2025-02-27 RX ORDER — GLYCOPYRROLATE 0.2 MG/ML
INJECTION, SOLUTION INTRAMUSCULAR; INTRAVENOUS AS NEEDED
Status: DISCONTINUED | OUTPATIENT
Start: 2025-02-27 | End: 2025-02-27 | Stop reason: SURG

## 2025-02-27 RX ORDER — NALOXONE HYDROCHLORIDE 0.4 MG/ML
0.08 INJECTION, SOLUTION INTRAMUSCULAR; INTRAVENOUS; SUBCUTANEOUS AS NEEDED
Status: DISCONTINUED | OUTPATIENT
Start: 2025-02-27 | End: 2025-02-27

## 2025-02-27 RX ORDER — HYDROMORPHONE HYDROCHLORIDE 1 MG/ML
0.2 INJECTION, SOLUTION INTRAMUSCULAR; INTRAVENOUS; SUBCUTANEOUS EVERY 5 MIN PRN
Status: DISCONTINUED | OUTPATIENT
Start: 2025-02-27 | End: 2025-02-27

## 2025-02-27 RX ORDER — MORPHINE SULFATE 10 MG/ML
6 INJECTION, SOLUTION INTRAMUSCULAR; INTRAVENOUS EVERY 10 MIN PRN
Status: DISCONTINUED | OUTPATIENT
Start: 2025-02-27 | End: 2025-02-27

## 2025-02-27 RX ORDER — NICOTINE POLACRILEX 4 MG
15 LOZENGE BUCCAL
Status: DISCONTINUED | OUTPATIENT
Start: 2025-02-27 | End: 2025-02-27

## 2025-02-27 RX ADMIN — KETOROLAC TROMETHAMINE 30 MG: 30 INJECTION, SOLUTION INTRAMUSCULAR; INTRAVENOUS at 12:55:00

## 2025-02-27 RX ADMIN — ROCURONIUM BROMIDE 40 MG: 10 INJECTION, SOLUTION INTRAVENOUS at 12:19:00

## 2025-02-27 RX ADMIN — GLYCOPYRROLATE 0.2 MG: 0.2 INJECTION, SOLUTION INTRAMUSCULAR; INTRAVENOUS at 12:19:00

## 2025-02-27 RX ADMIN — SODIUM CHLORIDE, SODIUM LACTATE, POTASSIUM CHLORIDE, CALCIUM CHLORIDE: 600; 310; 30; 20 INJECTION, SOLUTION INTRAVENOUS at 13:12:00

## 2025-02-27 RX ADMIN — DEXAMETHASONE SODIUM PHOSPHATE 8 MG: 4 MG/ML VIAL (ML) INJECTION at 12:22:00

## 2025-02-27 RX ADMIN — MIDAZOLAM HYDROCHLORIDE 2 MG: 1 INJECTION INTRAMUSCULAR; INTRAVENOUS at 12:10:00

## 2025-02-27 RX ADMIN — ONDANSETRON 4 MG: 2 INJECTION INTRAMUSCULAR; INTRAVENOUS at 12:22:00

## 2025-02-27 RX ADMIN — SODIUM CHLORIDE, SODIUM LACTATE, POTASSIUM CHLORIDE, CALCIUM CHLORIDE: 600; 310; 30; 20 INJECTION, SOLUTION INTRAVENOUS at 12:10:00

## 2025-02-27 RX ADMIN — LIDOCAINE HYDROCHLORIDE 80 MG: 20 INJECTION, SOLUTION EPIDURAL; INFILTRATION; INTRACAUDAL; PERINEURAL at 12:19:00

## 2025-02-27 NOTE — ANESTHESIA POSTPROCEDURE EVALUATION
Patient: Dawson Ibarra    Procedure Summary       Date: 02/27/25 Room / Location: [facility] MAIN OR  / [facility] MAIN OR    Anesthesia Start: 1210 Anesthesia Stop:     Procedure: Robotic left inguinal hernia repair with mesh (Left: Abdomen) Diagnosis:       Incarcerated left inguinal hernia      (Incarcerated left inguinal hernia [K40.30])    Surgeons: Jonh Chang MD Anesthesiologist: John Gandara MD    Anesthesia Type: general ASA Status: 2            Anesthesia Type: general    Vitals Value Taken Time   /91 02/27/25 1316   Temp 99 02/27/25 1317   Pulse 78 02/27/25 1316   Resp 20 02/27/25 1316   SpO2 100 % 02/27/25 1316   Vitals shown include unfiled device data.    EM AN Post Evaluation:   Patient Evaluated in PACU  Patient Participation: waiting for patient participation  Level of Consciousness: sleepy but conscious  Pain Management: adequate  Airway Patency:patent  Dental exam unchanged from preop  Yes    Nausea/Vomiting: none  Cardiovascular Status: acceptable  Respiratory Status: acceptable and nasal cannula  Postoperative Hydration acceptable      Madelyn Reaves CRNA  2/27/2025 1:17 PM

## 2025-02-27 NOTE — ANESTHESIA PREPROCEDURE EVALUATION
Anesthesia PreOp Note    HPI:     Dawson Ibarra is a 49 year old male who presents for preoperative consultation requested by: Jonh Chang MD    Date of Surgery: 2/27/2025    Procedure(s):  Robotic left inguinal hernia repair with mesh  Indication: Incarcerated left inguinal hernia [K40.30]    Relevant Problems   No relevant active problems       NPO:  Last Liquid Consumption Date: 02/26/25  Last Liquid Consumption Time: 2200  Last Solid Consumption Date: 02/26/25  Last Solid Consumption Time: 2200  Last Liquid Consumption Date: 02/26/25          History Review:  There are no active problems to display for this patient.      Past Medical History:   • Diabetes (HCC)   • High cholesterol   • Hyperlipidemia   • PONV (postoperative nausea and vomiting)   • Visual impairment    wear glasses       Past Surgical History:   Procedure Laterality Date   • Hernia surgery      10 years ago       Prescriptions Prior to Admission[1]  Current Medications and Prescriptions Ordered in Epic[2]    Allergies[3]    Family History   Problem Relation Age of Onset   • Diabetes Father    • Other (infection of foot) Father    • Diabetes Mother    • Diabetes Sister    • Diabetes Brother      Social History     Socioeconomic History   • Marital status:    Tobacco Use   • Smoking status: Never     Passive exposure: Never   • Smokeless tobacco: Never   Vaping Use   • Vaping status: Never Used   Substance and Sexual Activity   • Alcohol use: Yes     Comment: social   • Drug use: Never       Available pre-op labs reviewed.             Vital Signs:  Body mass index is 23.57 kg/m².   height is 1.676 m (5' 6\") and weight is 66.2 kg (146 lb). His oral temperature is 98 °F (36.7 °C). His blood pressure is 148/91 (abnormal) and his pulse is 84. His respiration is 16 and oxygen saturation is 98%.   Vitals:    02/21/25 1805 02/27/25 1107 02/27/25 1119   BP:   (!) 148/91   Pulse:   84   Resp:   16   Temp:   98 °F (36.7 °C)   TempSrc:   Oral    SpO2:   98%   Weight: 68 kg (150 lb) 66.2 kg (146 lb)    Height: 1.676 m (5' 6\")          Anesthesia Evaluation     Patient summary reviewed and Nursing notes reviewed    History of anesthetic complications   Airway   Mallampati: II  TM distance: >3 FB  Neck ROM: full  Dental          Pulmonary - negative ROS and normal exam   Cardiovascular - negative ROS and normal exam    ECG reviewed    Neuro/Psych - negative ROS     GI/Hepatic/Renal - negative ROS     Endo/Other    (+) diabetes mellitus  Abdominal      Other findings: #8 very loose tooth, receding gums. Discussed dental risk, verbalized understanding and wishes to proceed.          Anesthesia Plan:   ASA:  2  Plan:   General  Airway:  ETT  Post-op Pain Management: IV analgesics  Informed Consent Plan and Risks Discussed With:  Patient and sibling  Discussed plan with:  CRNA      I have informed Dawson Ibarra and/or legal guardian or family member of the nature of the anesthetic plan, benefits, risks including possible dental damage if relevant, major complications, and any alternative forms of anesthetic management.   All of the patient's questions were answered to the best of my ability. The patient desires the anesthetic management as planned.  John Gandara MD  2/27/2025 11:27 AM  Present on Admission:  **None**           [1]   Medications Prior to Admission   Medication Sig Dispense Refill Last Dose/Taking   • metFORMIN 500 MG Oral Tab Take 1 tablet (500 mg total) by mouth 2 (two) times daily with meals.   2/26/2025 at  7:00 PM   • Cholecalciferol (VITAMIN D) 125 MCG (5000 UT) Oral Cap Take 1 capsule (5,000 Units total) by mouth daily. 90 capsule 3 2/26/2025 at  8:00 AM   [2]   Current Facility-Administered Medications Ordered in Epic   Medication Dose Route Frequency Provider Last Rate Last Admin   • lactated ringers infusion   Intravenous Continuous Jonh Chang MD 20 mL/hr at 02/27/25 1120 New Bag at 02/27/25 1120   • ceFAZolin (Ancef) 2g  in 10mL IV syringe premix  2 g Intravenous Once Jonh Chang MD         No current [facility]-ordered outpatient medications on file.   [3] No Known Allergies

## 2025-02-27 NOTE — H&P
History and Physical      HPI     No chief complaint on file.      HPI  Dawson Ibarra is a 49 year old male who presents with a left inguinal hernia.  Most symptomatic after activity.  Present for 2 years.    Past Medical History:    Diabetes (HCC)    High cholesterol    Hyperlipidemia    PONV (postoperative nausea and vomiting)    Visual impairment    wear glasses     Past Surgical History:   Procedure Laterality Date    Hernia surgery      10 years ago     No current outpatient medications on file.     ALLERGIES  Allergies[1]    Social History     Socioeconomic History    Marital status:    Tobacco Use    Smoking status: Never     Passive exposure: Never    Smokeless tobacco: Never   Vaping Use    Vaping status: Never Used   Substance and Sexual Activity    Alcohol use: Yes     Comment: social    Drug use: Never     Family History   Problem Relation Age of Onset    Diabetes Father     Other (infection of foot) Father     Diabetes Mother     Diabetes Sister     Diabetes Brother        Review of Systems   A comprehensive 10 point review of systems was completed.  Pertinent positives and negatives noted in the the HPI.    PHYSICAL EXAM   BP (!) 148/91 (BP Location: Right arm)   Pulse 84   Temp 98 °F (36.7 °C) (Oral)   Resp 16   Ht 5' 6\" (1.676 m)   Wt 146 lb (66.2 kg)   SpO2 98%   BMI 23.57 kg/m²  No LMP for male patient.   Constitutional: appears well hydrated alert and responsive no acute distress noted  Head/Face: normocephalic  Nose/Mouth/Throat: nose and throat are clear palate is intact mucous membranes are moist no oral lesions are noted  Neck/Thyroid: neck is supple without adenopathy  Respiratory: normal to inspection lungs are clear to auscultation bilaterally normal respiratory effort  Cardiovascular: regular rate and rhythm no murmurs, gallups, or rubs  Abdomen: soft non-tender non-distended no organomegaly noted no masses  Left inguinal hernia, recurrence and not reducible  Extremities:  no edema, cyanosis, or clubbing  Neurological: exam appropriate for age reflexes and motor skills appropriate for age      ASSESSMENT/PLAN   Assessment       49 year old male with incarcerated recurrent left inguinal hernia  We have discussed the surgical risks, benefits, alternatives, and expected recovery. We will plan robotic repair incarcerated left inguinal hernia with mesh. All of the patient's questions have been answered to his satisfaction.       2/27/2025  Jonh Chang MD             [1] No Known Allergies

## 2025-02-27 NOTE — ANESTHESIA PROCEDURE NOTES
Airway  Date/Time: 2/27/2025 12:21 PM  Urgency: elective      General Information and Staff    Patient location during procedure: OR  Anesthesiologist: John Gandara MD  Resident/CRNA: Madelyn Reaves CRNA  Performed: CRNA   Performed by: Madelyn Reaves CRNA  Authorized by: John Gandara MD      Indications and Patient Condition  Indications for airway management: anesthesia  Spontaneous Ventilation: absent  Sedation level: deep  Preoxygenated: yes  Patient position: sniffing  MILS maintained throughout  Mask difficulty assessment: 2 - vent by mask + OA or adjuvant +/- NMBA    Final Airway Details  Final airway type: endotracheal airway      Successful airway: ETT     Successful intubation technique: Video laryngoscopy  Endotracheal tube insertion site: oral  Blade: GlideScope  Blade size: #3  ETT size (mm): 7.5    Cormack-Lehane Classification: grade I - full view of glottis  Placement verified by: capnometry   Measured from: lips    Additional Comments  Easy, atraumatic intubation. Dentition, lips and mucosa unchanged.

## 2025-02-27 NOTE — TELEPHONE ENCOUNTER
Received disability from principal in forms department. Logged for processing. Valid ouside authorization attached.

## 2025-02-27 NOTE — OPERATIVE REPORT
[facility]    PATIENT'S NAME: LAUREN ERNST   ATTENDING PHYSICIAN: Jonh Chang MD   OPERATING PHYSICIAN: Jonh Chang MD   PATIENT ACCOUNT#:   [id number]    LOCATION:  [facility]  MEDICAL RECORD #:   [id number]       YOB: 1975  ADMISSION DATE:       02/27/2025      OPERATION DATE:  02/27/2025    OPERATIVE REPORT      PREOPERATIVE DIAGNOSIS:  Incarcerated recurrent left inguinal hernia.  POSTOPERATIVE DIAGNOSIS:  Incarcerated recurrent left inguinal hernia.  PROCEDURE:  Robotic repair of recurrent incarcerated left inguinal hernia with 10 x 15 ProGrip.    ASSISTANT:  CINTHYA [place].    ESTIMATED BLOOD LOSS:  5 mL.    COMPLICATIONS:  None.    ANESTHESIA:  General.    DISPOSITION:  To Recovery, tolerated well.    INDICATIONS:  Patient is 49 who presents with the above complaint.  Consent obtained.      OPERATIVE TECHNIQUE:  He is taken to surgery, is prepped and draped in usual sterile fashion.  Veress needle placed at [place] point.  Abdomen insufflated.  Three trocars are placed per standard technique.  The patient is placed in Trendelenburg.  Robot is docked.  A plug is seen on the right extending through the peritoneal cavity.  On the left, there is also a plug medially, but a recurrent incarcerated indirect hernia.  The contents including omentum and preperitoneal fat are reduced.  Peritoneal flaps are elevated widely.  There is some difficulty mobilizing the flap inferiorly due to the prior hernia and the peritoneum stuck to the mesh plug.  The contents are reduced.  Hernia sac completely reduced.  A 10 x 15 ProGrip mesh is placed in the preperitoneal space, opened and secured.  Peritoneum closed using running 2-0 V-Loc.  Hemostasis is verified.  Trocars removed.  Skin closed with 3-0 Monocryl, benzoin, and Steri-Strips.  Marcaine infiltrated for local block.    Dictated By Jonh Chang MD  d: 02/27/2025 13:00:00  t: 02/27/2025  17:39:43  [facility] [id number]  GL/    cc: Santo Graham MD

## 2025-03-02 ENCOUNTER — TELEPHONE (OUTPATIENT)
Facility: LOCATION | Age: 50
End: 2025-03-02

## 2025-03-02 DIAGNOSIS — E11.69 TYPE 2 DIABETES MELLITUS WITH OTHER SPECIFIED COMPLICATION, UNSPECIFIED WHETHER LONG TERM INSULIN USE (HCC): Primary | ICD-10-CM

## 2025-03-02 NOTE — TELEPHONE ENCOUNTER
José Luis Ibarra    This is Dr. Santo Graham MD, MPH  I am reaching out in regards to remind you that  you are due for Annual Physical Exam, Diabetes Follow up, and You are also due for diabetes eye exam, If you do not have cataracts or floaters we can do a retinal screening in office in our [place] location, however if you have vision problems, I can place a referral to ophthalmologist if you don't already have one. If you already do have one please make sure that they please send a  Fax of your annual retinal screen to our office fax number: [phone]     Please complete this within the next month as I value providing high value evidence based medical care and prevention Dr. Graham and would like to see you within the next month.    However if you have established care with another provider please call our office [phone] or send us a [facility] message and we will remove your name from our list to indicate you have a new provider and will gladly send records to them if requested.      Wish you all the best and stay healthy  -Dr. Santo Graham MD, MPH

## 2025-03-04 NOTE — TELEPHONE ENCOUNTER
Patient called (conference call placed to Bengali interpretor). Patient calling to check the status of disability paperwork. Advised form has been completed and is pending signature from the provider.

## 2025-03-06 NOTE — TELEPHONE ENCOUNTER
Release of Information that was filled out in office for Principal has different fax number than the one listed on forms. Efaxed form to fax listed on Release of Information. Awaiting confirmation. Will call patient with fax confirmation/failure outcome.

## 2025-03-06 NOTE — TELEPHONE ENCOUNTER
Fax failed. Called patient to inform him. Patient understood. Advised patient to  forms at office, patient asked if he could go to the same office where he filled out to Release of Information. Told him he could. Patient states he will pick them up today. Nothing else needed from forms dept at this time.

## 2025-03-12 ENCOUNTER — NURSE ONLY (OUTPATIENT)
Dept: SURGERY | Facility: CLINIC | Age: 50
End: 2025-03-12
Payer: COMMERCIAL

## 2025-03-12 VITALS — SYSTOLIC BLOOD PRESSURE: 125 MMHG | DIASTOLIC BLOOD PRESSURE: 83 MMHG | HEART RATE: 88 BPM

## 2025-03-12 DIAGNOSIS — Z48.89 ENCOUNTER FOR POSTOPERATIVE CARE: Primary | ICD-10-CM

## 2025-03-12 PROBLEM — K40.30 INCARCERATED LEFT INGUINAL HERNIA: Status: RESOLVED | Noted: 2025-02-27 | Resolved: 2025-03-12

## 2025-03-12 PROCEDURE — 99024 POSTOP FOLLOW-UP VISIT: CPT

## 2025-03-12 NOTE — PROGRESS NOTES
Follow Up Visit Note       Active Problems      1. Encounter for postoperative care          Chief Complaint   Chief Complaint   Patient presents with    Post-Op     S/p Robotic repair of recurrent incarcerated left inguinal hernia with 10 x 15 ProGrip on 2/27/25. Pt does have some pain 3/10 especially with walking and bending. He denies drainage and bowel/bladder problems.          History of Present Illness  Dawson is a pleasant 49 year old year old patient presenting for post op appointment. he generally feels well, he denies groin or abdominal pain. He does have discomfort in his left groin when he bends or twists. he is tolerating a general diet without diarrhea or constipation. he denies fever, chills, SOB, chest pain, leg swelling or calf tenderness. He lifts heavier than 50lbs for his work, has been off work.       Allergies  Dawson has No Known Allergies.    Past Medical / Surgical / Social / Family History    The past medical and past surgical history have been reviewed by me today.    Past Medical History:    Diabetes (HCC)    High cholesterol    Hyperlipidemia    Incarcerated left inguinal hernia    PONV (postoperative nausea and vomiting)    Visual impairment    wear glasses     Past Surgical History:   Procedure Laterality Date    Hernia surgery      10 years ago    Inguinal hernia repair Left 02/27/2025    Robotic repair of recurrent incarcerated left inguinal hernia with 10 x 15 ProGrip.       The family history and social history have been reviewed by me today.    Family History   Problem Relation Age of Onset    Diabetes Father     Other (infection of foot) Father     Diabetes Mother     Diabetes Sister     Diabetes Brother      Social History     Socioeconomic History    Marital status:    Tobacco Use    Smoking status: Never     Passive exposure: Never    Smokeless tobacco: Never   Vaping Use    Vaping status: Never Used   Substance and Sexual Activity    Alcohol use: Yes     Comment: social     Drug use: Never        Current Outpatient Medications:     docusate sodium 100 MG Oral Cap, Take 1 capsule (100 mg total) by mouth 2 (two) times daily., Disp: 30 capsule, Rfl: 0    metFORMIN 500 MG Oral Tab, Take 1 tablet (500 mg total) by mouth 2 (two) times daily with meals., Disp: , Rfl:      Review of Systems  The Review of Systems has been reviewed by me during today.  Review of Systems   Constitutional:  Negative for chills, fatigue and fever.   Respiratory:  Negative for shortness of breath.    Cardiovascular:  Negative for chest pain and leg swelling.   Gastrointestinal:  Negative for abdominal pain, constipation, diarrhea, nausea and vomiting.        Physical Findings   /83   Pulse 88   Physical Exam  Constitutional:       General: He is not in acute distress.     Appearance: He is not ill-appearing.   HENT:      Head: Normocephalic and atraumatic.   Eyes:      Extraocular Movements: Extraocular movements intact.      Conjunctiva/sclera: Conjunctivae normal.      Pupils: Pupils are equal, round, and reactive to light.   Abdominal:      General: Abdomen is flat. There is no distension.      Palpations: Abdomen is soft.      Tenderness: There is no abdominal tenderness.   Skin:     General: Skin is warm.      Comments: incision C/D/I.   Neurological:      Mental Status: He is alert.          Assessment   1. Encounter for postoperative care      Plan   Patient is unable to do light duty at work, patient to be excused from work until 4/2/2025. He may resume full duty thereafter.   Continue good hygiene of incision site  OK to swim or take a bath  Follow up as needed       No orders of the defined types were placed in this encounter.      Imaging & Referrals   None    Follow Up  No follow-ups on file.    CHRISTY Duvall

## 2025-04-02 ENCOUNTER — TELEPHONE (OUTPATIENT)
Dept: SURGERY | Facility: CLINIC | Age: 50
End: 2025-04-02

## 2025-04-02 NOTE — TELEPHONE ENCOUNTER
brandon, pt's employer called.  Pt returned back to work today 4-2-25 with a note.  Any restrictions. How long. Please fax a new note to [phone]

## 2025-04-02 NOTE — TELEPHONE ENCOUNTER
Discussed with Rosa nelson to write a note to return to work on 4/2 with no restrictions.   Note generated and faxed to [place] as requested.

## 2025-04-07 ENCOUNTER — OFFICE VISIT (OUTPATIENT)
Dept: INTERNAL MEDICINE CLINIC | Facility: CLINIC | Age: 50
End: 2025-04-07

## 2025-04-07 ENCOUNTER — NURSE ONLY (OUTPATIENT)
Dept: INTERNAL MEDICINE CLINIC | Facility: CLINIC | Age: 50
End: 2025-04-07

## 2025-04-07 VITALS — DIASTOLIC BLOOD PRESSURE: 84 MMHG | HEART RATE: 106 BPM | SYSTOLIC BLOOD PRESSURE: 160 MMHG

## 2025-04-07 DIAGNOSIS — E55.9 VITAMIN D DEFICIENCY: ICD-10-CM

## 2025-04-07 DIAGNOSIS — E11.9 TYPE 2 DIABETES MELLITUS WITHOUT COMPLICATION, WITHOUT LONG-TERM CURRENT USE OF INSULIN (HCC): Primary | Chronic | ICD-10-CM

## 2025-04-07 DIAGNOSIS — E11.69 TYPE 2 DIABETES MELLITUS WITH OTHER SPECIFIED COMPLICATION, UNSPECIFIED WHETHER LONG TERM INSULIN USE (HCC): ICD-10-CM

## 2025-04-07 DIAGNOSIS — Z13.6 ENCOUNTER FOR SCREENING FOR CORONARY ARTERY DISEASE: ICD-10-CM

## 2025-04-07 DIAGNOSIS — E11.69 HYPERLIPIDEMIA ASSOCIATED WITH TYPE 2 DIABETES MELLITUS (HCC): Chronic | ICD-10-CM

## 2025-04-07 DIAGNOSIS — E11.59 HYPERTENSION ASSOCIATED WITH DIABETES (HCC): Chronic | ICD-10-CM

## 2025-04-07 DIAGNOSIS — H53.9 CHANGES IN VISION: ICD-10-CM

## 2025-04-07 DIAGNOSIS — E78.5 HYPERLIPIDEMIA ASSOCIATED WITH TYPE 2 DIABETES MELLITUS (HCC): Chronic | ICD-10-CM

## 2025-04-07 DIAGNOSIS — K76.0 METABOLIC DYSFUNCTION-ASSOCIATED STEATOTIC LIVER DISEASE (MASLD): ICD-10-CM

## 2025-04-07 DIAGNOSIS — Z00.00 ANNUAL PHYSICAL EXAM: ICD-10-CM

## 2025-04-07 DIAGNOSIS — Z12.5 SCREENING FOR PROSTATE CANCER: ICD-10-CM

## 2025-04-07 DIAGNOSIS — R91.1 PULMONARY NODULE, LEFT: ICD-10-CM

## 2025-04-07 DIAGNOSIS — I15.2 HYPERTENSION ASSOCIATED WITH DIABETES (HCC): Chronic | ICD-10-CM

## 2025-04-07 LAB
CARTRIDGE EXPIRATION DATE: ABNORMAL DATE
HEMOGLOBIN A1C: 6.8 % (ref 4.3–5.6)

## 2025-04-07 PROCEDURE — 92229 IMG RTA DETC/MNTR DS POC ALY: CPT | Performed by: STUDENT IN AN ORGANIZED HEALTH CARE EDUCATION/TRAINING PROGRAM

## 2025-04-07 RX ORDER — ROSUVASTATIN CALCIUM 10 MG/1
10 TABLET, COATED ORAL NIGHTLY
Qty: 90 TABLET | Refills: 3 | Status: SHIPPED | OUTPATIENT
Start: 2025-04-07

## 2025-04-07 RX ORDER — LOSARTAN POTASSIUM 100 MG/1
100 TABLET ORAL NIGHTLY
Qty: 90 TABLET | Refills: 3 | Status: SHIPPED | OUTPATIENT
Start: 2025-04-07

## 2025-04-07 NOTE — PROGRESS NOTES
Patient is here for DM retina camera eye exam. Verified full name, , and active order for diabetic retinopathy exam [facility]. DM eye exam completed. Patient and Dr Graham aware of results. Pt has no diabetic retinopathy. Advised retest in 1 year and can see ophthalmology if has any eye changes or issues. Pt verbalized understanding

## 2025-04-07 NOTE — PROGRESS NOTES
OFFICE NOTE       The following individual(s) verbally consented to be recorded using ambient AI listening technology and understand that they can each withdraw their consent to this listening technology at any point by asking the clinician to turn off or pause the recording:    Patient name: Dawson Ibarra  Additional names:        [id number]        Patient ID: Dawson Ibarra is a 49 year old male.  Today's Date: 04/07/25  Chief Complaint: Physical      History of Present Illness  Dawson Ibarra is a 49 year old male who presents for routine follow-up and evaluation of a pulmonary nodule.    He has a history of a pulmonary nodule in the left lower lobe, measuring 7 to 8 millimeters, identified on a CT scan in January 2024. He is due for a repeat CT scan for further evaluation.    His diabetes management includes monitoring his A1c levels, which is currently 6.8. His A1c was previously 5.8 in April of last year, 9.1 in February, and 12.7 when first diagnosed. He attributes the recent increase to dietary indiscretions during a trip to [place], where he consumed alcohol and did not adhere to his diet.    He has a history of hypertension, with a recent blood pressure reading of 162/84. He mentions not sleeping well recently.    He has a history of hyperlipidemia.    He mentions a recent hernia surgery and requests a work-related restriction letter due to the physical demands of his job. He has had three abdominal surgeries and seeks a restriction on lifting more than 25 to 30 pounds indefinitely.       A1C 6.8    Lab Results   Component Value Date    A1C 5.8 (A) 04/20/2024    A1C 9.1 (A) 02/24/2024    A1C 12.7 (A) 01/22/2024         Vitals:    04/07/25 1715 04/07/25 1719 04/07/25 1733   BP: 146/86 139/86 160/84   Pulse: 99 106      body mass index is unknown because there is no height or weight on file.  BP Readings from Last 3 Encounters:   04/07/25 160/84   03/12/25 125/83   02/27/25 143/86     The  ASCVD Risk score (Laureano ELDER, et al., 2019) failed to calculate for the following reasons:    The valid total cholesterol range is 130 to 320 mg/dL  Results  LABS  A1c: 6.8 (04/07/2025)  A1c: 5.8 (04/2024)  A1c: 9.1 (02/2024)  A1c: 12.7    RADIOLOGY  CT scan: Pulmonary nodule in the left lower lobe, 7-8 mm (01/24/2024)    DIAGNOSTIC  Colonoscopy: Normal (12/03/2024)  EKG: Normal (01/23/2024)       Medications reviewed:  Current Outpatient Medications   Medication Sig Dispense Refill    rosuvastatin 10 MG Oral Tab Take 1 tablet (10 mg total) by mouth nightly. FOR CHOLESTEROL. 90 tablet 3    losartan 100 MG Oral Tab Take 1 tablet (100 mg total) by mouth at bedtime. For Blood Pressure and Heart 90 tablet 3    metFORMIN 500 MG Oral Tab Take 1 tablet (500 mg total) by mouth 2 (two) times daily with meals. For DIABETES 180 tablet 3         Assessment & Plan    1. Type 2 diabetes mellitus without complication, without long-term current use of insulin (HCC) (Primary)  -     POC Hemoglobin A1C  -     Diabetic Retinopathy Exam; Future; Expected date: 04/07/2025  -     Microalb/Creat Ratio, Random Urine; Future; Expected date: 04/07/2025  -     Discontinue: metFORMIN HCl; Take 1 tablet (500 mg total) by mouth 2 (two) times daily with meals.  Dispense: 180 tablet; Refill: 3  -     metFORMIN HCl; Take 1 tablet (500 mg total) by mouth 2 (two) times daily with meals. For DIABETES  Dispense: 180 tablet; Refill: 3  2. Hyperlipidemia associated with type 2 diabetes mellitus (HCC)  -     Rosuvastatin Calcium; Take 1 tablet (10 mg total) by mouth nightly. FOR CHOLESTEROL.  Dispense: 90 tablet; Refill: 3  3. Hypertension associated with diabetes (HCC)  -     Losartan Potassium; Take 1 tablet (100 mg total) by mouth at bedtime. For Blood Pressure and Heart  Dispense: 90 tablet; Refill: 3  4. Metabolic dysfunction-associated steatotic liver disease (MASLD)  5. Pulmonary nodule, left  6. Annual physical exam  -     CT CALCIUM SCORING;  Future; Expected date: 04/07/2025  -     OPHTHALMOLOGY - INTERNAL  -     PSA Total, Screen; Future; Expected date: 04/07/2025  -     Lipid Panel; Future; Expected date: 04/07/2025  -     TSH W Reflex To Free T4; Future; Expected date: 04/07/2025  -     Comp Metabolic Panel (14); Future; Expected date: 04/07/2025  -     CBC With Differential With Platelet; Future; Expected date: 04/07/2025  -     Vitamin D; Future; Expected date: 04/07/2025  7. Encounter for screening for coronary artery disease  -     CT CALCIUM SCORING; Future; Expected date: 04/07/2025  8. Changes in vision  -     OPHTHALMOLOGY - INTERNAL  9. Screening for prostate cancer  -     PSA Total, Screen; Future; Expected date: 04/07/2025  10. Vitamin D deficiency  -     Vitamin D; Future; Expected date: 04/07/2025    Assessment & Plan  Hypertension  Blood pressure at 162/84 mmHg indicates poor control. Initiated losartan to manage blood pressure and protect renal and cardiac function. Goal: <130/80 mmHg.  - Initiate losartan 100 mg at night.  - Provide educational materials in [place] about hypertension and its management.  - Advise to reduce salt intake and maintain a healthy diet.  - Recommend purchasing a home blood pressure monitor for daily tracking.  - Schedule follow-up in 3 months to reassess blood pressure control.    Type 2 Diabetes Mellitus  HbA1c increased to 6.8% from 5.8%. Emphasized importance of glycemic control to prevent complications.  - Monitor HbA1c levels regularly.  - Encourage adherence to a diabetic-friendly diet and regular physical activity.  - Offer referral to a diabetic educator if needed.    Pulmonary Nodule  7-8 mm nodule in left lower lobe requires follow-up.  - Order a follow-up CT scan of the lungs to assess the pulmonary nodule.    Hyperlipidemia  Requires monitoring to reduce cardiovascular risk.  - Order a lipid panel to assess current cholesterol levels.    Diabetic Retinopathy Screening  Due for screening.  -  Perform a diabetic retinal eye exam.  - If abnormal, refer to ophthalmologist Dr. Dean Owusu.    Vitamin D Deficiency Screening  Due for level check.  - Check vitamin D levels.  - Initiate supplementation if levels are low.    Prostate Cancer Screening  Due for screening.  - Order PSA test for prostate cancer screening.    Work Restriction Due to Hernia  Multiple abdominal surgeries necessitate lifting restriction.  - Provide a work restriction letter limiting lifting to 30 pounds indefinitely.    Follow-up  Requires monitoring of blood pressure, diabetes management, and health screenings.  - Schedule follow-up appointment in July to reassess blood pressure and diabetes management.  - Ensure completion of all ordered tests and screenings before the next appointment.       Follow Up: As needed/if symptoms worsen or Return in about 3 months (around 7/7/2025) for Diabetes and blood pressure follow up..         Objective/ Results:   Physical Exam  Vitals reviewed.   Constitutional:       Appearance: He is well-developed.   HENT:      Head: Normocephalic and atraumatic.   Eyes:      Extraocular Movements: Extraocular movements intact.      Pupils: Pupils are equal, round, and reactive to light.   Cardiovascular:      Rate and Rhythm: Normal rate and regular rhythm.      Heart sounds: Normal heart sounds.   Pulmonary:      Effort: Pulmonary effort is normal.      Breath sounds: Normal breath sounds.   Abdominal:      General: Bowel sounds are normal.      Palpations: Abdomen is soft.      Tenderness: There is no abdominal tenderness.   Musculoskeletal:         General: Normal range of motion.   Feet:      Right foot:      Protective Sensation: 5 sites tested.  5 sites sensed.      Skin integrity: Skin integrity normal.      Left foot:      Protective Sensation: 5 sites tested.  5 sites sensed.      Skin integrity: Skin integrity normal.      Comments: Bilateral barefoot skin diabetic exam is normal, visualized feet  and the appearance is normal.  Bilateral monofilament/sensation of both feet is normal.  Pulsation pedal pulse exam of both lower legs/feet is normal as well.    Skin:     General: Skin is warm and dry.   Neurological:      Mental Status: He is alert and oriented to person, place, and time.      Deep Tendon Reflexes: Reflexes are normal and symmetric.        Physical Exam  VITALS: BP- 162/84     Reviewed:    Patient Active Problem List    Diagnosis    Type 2 diabetes mellitus without complication, without long-term current use of insulin ([facility])      Allergies[1]     Social History     Socioeconomic History    Marital status:    Tobacco Use    Smoking status: Never     Passive exposure: Never    Smokeless tobacco: Never   Vaping Use    Vaping status: Never Used   Substance and Sexual Activity    Alcohol use: Yes     Comment: social    Drug use: Never      Review of Systems   Constitutional: Negative.    Respiratory: Negative.     Cardiovascular: Negative.    Gastrointestinal: Negative.    Skin: Negative.    Neurological: Negative.        All other systems negative unless otherwise stated in ROS or HPI above.       Santo Graham MD  Internal Medicine       Call office with any questions or seek emergency care if necessary.   Patient understands and agrees to follow directions and advice.      ----------------------------------------- PATIENT INSTRUCTIONS-----------------------------------------     Patient Instructions   Cómo controlar la presión arterial renuka  La presión arterial renuka (hipertensión) se conoce también ailin el asesino silencioso. Se la llama así porque muchas personas la tienen sin saberlo. Puede ser muy peligrosa. La presión arterial renuka puede aumentar el riesgo de ataques al corazón, derrames cerebrales, enfermedades del corazón o insuficiencia cardíaca. Controlar la presión arterial puede disminuir el riesgo de tener estos problemas. Es importantecontrolar la presión arterial de manera  periódica. [place] puede salvarle la ezekiel.   Las mediciones de la presión arterial se indican con 2 números. La presión arterial sistólica es el número superior. Es la presión cuando el corazón se contrae. La presión arterial diastólica es el número inferior. Es la presión cuando el corazón se relaja entrelatidos.   La presión arterial se agrupa de la siguiente manera:  Presión arterial normal. Se llama de esta manera cuando la presión sistólica es xochitl que 120 y la diastólica es xochitl que 80 (120/80).  Presión arterial elevada. Se llama de esta manera cuando la presión sistólica es de 120 a 129 y la diastólica es xochitl que 80.  Presión arterial renuka de etapa 1. La presión sistólica está entre 130 y 139 o la diastólica está entre 80 y 89.  Presión arterial renuka de etapa 2. La presión sistólica es 140 o superior o la diastólica es 90 o superior.  Un estilo de ezekiel saludable para el corazón puede ayudarlo a controlar kahn presión arterial sin tener que colleen medicamentos. Más abajo encontrará algunasrecomendaciones a fin de llevar un estilo de ezekiel saludable para el corazón.     Coma alimentos saludables para el corazón  Elija alimentos con bajo contenido de sodio y grasas. Limite la ingesta de sodio a 2,300 mg diarios o según la cantidad que le indique el proveedor de atención médica.  Limite el consumo de comidas enlatadas, secas, curadas, envasadas y las comidas rápidas. Pueden contener mucha sal.  Consuma entre ocho y mehrdad porciones de frutas y verduras todos los rene.  Elija akhil de res magras, pescado o phyllis.  Coma pasta y arroz integrales y frijoles.  Consuma entre dos y kristi porciones de productos lácteos descremados o bajos en grasa.  Consulte con kahn médico acerca del plan de alimentación DASH. Randee plan ayuda a reducir la presión arterial.  Cuando coma en un restaurante, pida que le preparen los platos sin sal.    Mantenga un peso saludable  Pregunte a kahn proveedor de atención médica cuántas calorías puede  consumir por día. Respete sade cantidad.  Pregunte al proveedor cuáles son los límites de peso más adecuados para usted. Si tiene sobrepeso, perder entre un 3 % y un 5 % de kahn peso corporal puede ayudarlo a disminuir kahn presión arterial. Un buen objetivo es perder un 10 % de kahn peso corporal en un año.  Limite el consumo de tentempiés y dulces.  Bebe ejercicio con regularidad.    Bebe más actividad física  Busque actividades que le gusten. Puede hacerlas de forma individual, o con amigos o kahn rosales. Pruebe andar en bicicleta, bailar, caminar o trotar.  Estacione lejos de las entradas de los edificios para caminar más.  Use las escaleras en lugar del ascensor.  Cuando pueda, camine o vaya en bicicleta en vez de ir en automóvil.  [place] hojas, trabaje en kahn jardín o bebe reparaciones en la casa.  Bebe actividad física de moderada a intensa judi, al menos, 30 minutos por día, un mínimo de jefferson días a la semana.     Controle el estrés  Tómese tiempo para relajarse y disfrutar de la ezekiel. Encuentre tiempo para reírse.  Comparta allison preocupaciones con allison seres queridos y con el proveedor de atención médica.  Visite a allison familiares y amigos, y mantenga allison pasatiempos favoritos.    Limite el alcohol y deje de fumar  Los hombres no deberían colleen más de dos bebidas alcohólicas por día.  Las mujeres no deberían colleen más de rivera bebida alcohólica por día.  Si fuma, elabore un plan para dejar de hacerlo. Pídale ayuda a kahn proveedor de atención médica. Si fuma mucho, aumenta el riesgo de padecer enfermedades del corazón y derrames cerebrales. Consulte con el proveedor sobre programas para dejar de fumar y otros recursos de apoyo.    Medicamentos para la presión arterial  Si los cambios en el estilo de ezekiel no son suficientes, el proveedor de atención médica puede recetarle medicamentos para la presión arterial renuka. [place] todos los medicamentos según le indiquen. Si tiene alguna pregunta sobre los medicamentos,  consulte al proveedor antes de dejar de tomarlos ocambiarlos.     © 2000-2022 [employer]. Todos los derechos reservados. Esta información no pretende sustituir la atención médica profesional. Sólo sumédico puede diagnosticar y tratar un problema de kalyan.    [employer]  ¿Qué es Presión Arterial Denice?  Entender que es Presión Arterial, los riesgos en la kalyan al tener Presión Arterial Denice, los factores que lo ponen a usted en riesgo de tener Presión Arterial [place] y la importancia de trabajar con kahn proveedor del cuidado de la kalyan para controlarla.  Para long el video:  Escanee el código code  Utilizando kahn dispositivo móvil, escanee el siguiente código:  OR  Vaya al sitio web:  [email or web address]  Ingrese el código de receta:  3414S    © 2000-2022 [employer]. All rights reserved. This information is not intended as a substitute for professional medical care. Always follow your healthcare professional's instructions.    [employer]  [place] kim con presión arterial elevada  Ciertos alimentos pueden hacer que kahn presión suba demasiado. Fabian y aprenda lo fácil que es disfrutar de comidas deliciosas sin dañar kahn kalyan.     Para long el video:  Escanee el código QR  Utilizando kahn dispositivo móvil, escanee el siguiente código:  O  Vaya al sitio web:  [email or web address]  Ingrese el código de receta:   DPX    © 2000-2022 [employer]. All rights reserved. This information is not intended as a substitute for professional medical care. Always follow your healthcare professional's instructions.    Cómo tomarse la presión arterial  La presión arterial es la fuerza que la josefina ejerce contra las berger de los vasos al desplazarse por ellos. Usted puede tomarse kahn propia presión arterial con un medidor digital. Tómese la presión arterial a la misma hora y en elmismo brazo, tan a menudo ailin le indique kahn proveedor de atención médica.   Acerca de los monitores  de presión arterial   Los monitores de presión arterial están diseñados para diferentes edades y diferentes casos. Usted puede encontrar monitores para adultos mayores, para mujeres embarazadas y para niños. Asegúrese de que el que escoja es el adecuadopara kahn edad y situación.   La American Heart Association recomienda un monitor automatizado con manguito que se adapte al tamaño de kahn brazo (biceps). Kahn brazo debe caber kim dentro del manguito, ya que si es muy hasmukh o muy pequeño no suministrará rivera lectura exacta. Mídase el grosor de kahn barazo paraencontrar la talla correcta para usted.   Los monitores que se ahieren a un dedo o a la mae no son tan precisos comolos que se adhieren a kahn brazo.   Pídale a kahn proveedor de atención médica que le ayude a escoger un monitor. Traiga el monitor a kahn próxima eleazar médica si necesita ayuda para aprender ausarlo correctamente.   Los pasos que se dan a continuación son instrucciones generales para usar unmonitor digital automatizado.     1. Relájese  Tómese la presión arterial a la misma hora todos los días, ailin temprano en la mañana o al final de la tarde.  Espere al menos rivera hora después de julio fumado, comido o hecho ejercicio. No florian café, té, soda u otras bebidas cafeínadas antes de tomarse la presión.  Siéntese cómodamente a la lopez. Coloque el medidor digital cerca de usted.  Descanse judi algunos minutos antes de empezar.    2. Coloque el manguito  Ponga el brazo sobre la lopez, con la toscano de la mano hacia arriba. El brazo debe quedar a la altura del corazón. Ajuste el manguito alrededor del brazo, juan por encima del codo. Es mejor colocarlo sobre la piel desnuda, sin ropa. La mayoría de los manguitos le indicarán donde debe alinearlo con la arteria braquial (el vaso sanguíneo en la mitad del brazo en la parte interna del codo). Bernie las instrucciones que vienen con kahn monitor para long rivera ilustración. También puede llevar kahn manguito a la eleazar con kahn  proveedor de atención médica para que le muestren cómo usarlo correctamente.    3. Infle el manguito  Oprima el botón para iniciar el bombeo automático.  El manguito carter se aprieta y luego se afloja.  Los números cambian. Cuando paran de cambiar, el medidor indicará kahn presión arterial.  Tómese 2 o 3 lecturas con un minuto de diferencia entre rivera y otra.      4. Anote los resultados  [place] nota de los números de kahn presión arterial, así ailin de la fecha y la hora de la medición. Mantenga los resultados en un solo lugar, por ejemplo en un cuaderno. Incluso si kahn monitor tiene rivera memoria incorporada, mantenga rivera copia por escrito de los resultados.  Quite el manguito del brazo y apague la máquina.  Traiga los resultados de allison lecturas a cada rivera de las citas con kahn proveedor de atención médica. También anote el días si cambió la dosis de kahn medicamento. [place] inforamación debe incuirse en los registros que erma de kahn presión arterial para que le ayude a kahn proveedor de atención médica a evaluar qué tan kim están funcionando los cambios en el medicamento.  Pregúntele a kahn proveedor de atención médica a qué niveles debe obtener ayuda de inmediato.      © 2847-4896 [employer]. Todos los derechos reservados. Esta información no pretende sustituir la atención médica profesional. Sólo sumédico puede diagnosticar y tratar un problema de kalyan.                         The 21st Century Cures Act makes medical notes available to patients in the interest of transparency.  However, please be advised that this is a medical document.  It is intended as a peer to peer communication.  It is written in medical language and may contain abbreviations or verbiage that are technical and unfamiliar.  It may appear blunt or direct.  Medical documents are intended to carry relevant information, facts as evident, and the clinical opinion of the practitioner.          [1] No Known Allergies

## 2025-04-07 NOTE — PATIENT INSTRUCTIONS
Cómo controlar la presión arterial renuka  La presión arterial renuka (hipertensión) se conoce también ailin el asesino silencioso. Se la llama así porque muchas personas la tienen sin saberlo. Puede ser muy peligrosa. La presión arterial renuka puede aumentar el riesgo de ataques al corazón, derrames cerebrales, enfermedades del corazón o insuficiencia cardíaca. Controlar la presión arterial puede disminuir el riesgo de tener estos problemas. Es importantecontrolar la presión arterial de manera periódica. [place] puede salvarle la ezekiel.   Las mediciones de la presión arterial se indican con 2 números. La presión arterial sistólica es el número superior. Es la presión cuando el corazón se contrae. La presión arterial diastólica es el número inferior. Es la presión cuando el corazón se relaja entrelatidos.   La presión arterial se agrupa de la siguiente manera:  Presión arterial normal. Se llama de esta manera cuando la presión sistólica es xochitl que 120 y la diastólica es xochitl que 80 (120/80).  Presión arterial elevada. Se llama de esta manera cuando la presión sistólica es de 120 a 129 y la diastólica es xochitl que 80.  Presión arterial renuka de etapa 1. La presión sistólica está entre 130 y 139 o la diastólica está entre 80 y 89.  Presión arterial renuka de etapa 2. La presión sistólica es 140 o superior o la diastólica es 90 o superior.  Un estilo de ezekiel saludable para el corazón puede ayudarlo a controlar kahn presión arterial sin tener que colleen medicamentos. Más abajo encontrará algunasrecomendaciones a fin de llevar un estilo de ezekiel saludable para el corazón.     Coma alimentos saludables para el corazón  Elija alimentos con bajo contenido de sodio y grasas. Limite la ingesta de sodio a 2,300 mg diarios o según la cantidad que le indique el proveedor de atención médica.  Limite el consumo de comidas enlatadas, secas, curadas, envasadas y las comidas rápidas. Pueden contener mucha sal.  Consuma entre ocho y mehrdad porciones  de frutas y verduras todos los rene.  Elija akhil de res magras, pescado o phyllis.  Coma pasta y arroz integrales y frijoles.  Consuma entre dos y kristi porciones de productos lácteos descremados o bajos en grasa.  Consulte con kahn médico acerca del plan de alimentación DASH. Randee plan ayuda a reducir la presión arterial.  Cuando coma en un restaurante, pida que le preparen los platos sin sal.    Mantenga un peso saludable  Pregunte a kahn proveedor de atención médica cuántas calorías puede consumir por día. Respete sade cantidad.  Pregunte al proveedor cuáles son los límites de peso más adecuados para usted. Si tiene sobrepeso, perder entre un 3 % y un 5 % de kahn peso corporal puede ayudarlo a disminuir kahn presión arterial. Un buen objetivo es perder un 10 % de kahn peso corporal en un año.  Limite el consumo de tentempiés y dulces.  Erma ejercicio con regularidad.    Erma más actividad física  Busque actividades que le gusten. Puede hacerlas de forma individual, o con amigos o kahn rosales. Pruebe andar en bicicleta, bailar, caminar o trotar.  Estacione lejos de las entradas de los edificios para caminar más.  Use las escaleras en lugar del ascensor.  Cuando pueda, camine o vaya en bicicleta en vez de ir en automóvil.  Rastrille hojas, trabaje en kahn jardín o erma reparaciones en la casa.  Erma actividad física de moderada a intensa judi, al menos, 30 minutos por día, un mínimo de jefferson días a la semana.     Controle el estrés  Tómese tiempo para relajarse y disfrutar de la ezekiel. Encuentre tiempo para reírse.  Comparta allison preocupaciones con allison seres queridos y con el proveedor de atención médica.  Visite a allison familiares y amigos, y mantenga allison pasatiempos favoritos.    Limite el alcohol y deje de fumar  Los hombres no deberían colleen más de dos bebidas alcohólicas por día.  Las mujeres no deberían colleen más de rivera bebida alcohólica por día.  Si fuma, elabore un plan para dejar de hacerlo. Pídale ayuda a kahn proveedor de  atención médica. Si fuma mucho, aumenta el riesgo de padecer enfermedades del corazón y derrames cerebrales. Consulte con el proveedor sobre programas para dejar de fumar y otros recursos de apoyo.    Medicamentos para la presión arterial  Si los cambios en el estilo de ezekiel no son suficientes, el proveedor de atención médica puede recetarle medicamentos para la presión arterial denice. [place] todos los medicamentos según le indiquen. Si tiene alguna pregunta sobre los medicamentos, consulte al proveedor antes de dejar de tomarlos ocambiarlos.     © 2000-2022 [employer]. Todos los derechos reservados. Esta información no pretende sustituir la atención médica profesional. Sólo sumédico puede diagnosticar y tratar un problema de kalyan.    Video [employer] [employer]  ¿Qué es Presión Arterial Denice?  Entender que es Presión Arterial, los riesgos en la kalyan al tener Presión Arterial Dencie, los factores que lo ponen a usted en riesgo de tener Presión Arterial [place] y la importancia de trabajar con kahn proveedor del cuidado de la kalyan para controlarla.  Para long el video:  Escanee el código code  Utilizando kahn dispositivo móvil, escanee el siguiente código:  OR  Apurva al sitio web:  [employer]  Ingrese el código de receta:  3414S    © 2000-2022 [employer]. All rights reserved. This information is not intended as a substitute for professional medical care. Always follow your healthcare professional's instructions.    Video [place] [employer] [facility]  Plain kim con presión arterial elevada  Ciertos alimentos pueden hacer que kahn presión suba demasiado. Fabian y aprenda lo fácil que es disfrutar de comidas deliciosas sin dañar kahn kalyan.     Para long el video:  Escanee el código QR  Utilizando kahn dispositivo móvil, escanee el siguiente código:  CHARLINE Mixon al IRIS-RFIDio web:  www.[employer]  Ingrese el código de receta:   DPX    © 8440-3667 [employer]. All rights reserved. This information is not  intended as a substitute for professional medical care. Always follow your healthcare professional's instructions.    Cómo tomarse la presión arterial  La presión arterial es la fuerza que la josefina ejerce contra las berger de los vasos al desplazarse por ellos. Usted puede tomarse kahn propia presión arterial con un medidor digital. Tómese la presión arterial a la misma hora y en elmismo brazo, tan a menudo ailin le indique kahn proveedor de atención médica.   Acerca de los monitores de presión arterial   Los monitores de presión arterial están diseñados para diferentes edades y diferentes casos. Usted puede encontrar monitores para adultos mayores, para mujeres embarazadas y para niños. Asegúrese de que el que escoja es el adecuadopara kahn edad y situación.   La American Heart Association recomienda un monitor automatizado con manguito que se adapte al tamaño de kahn brazo (biceps). Kahn brazo debe caber kim dentro del manguito, ya que si es muy hasmukh o muy pequeño no suministrará rivera lectura exacta. Mídase el grosor de kahn barazo paraencontrar la talla correcta para usted.   Los monitores que se ahieren a un dedo o a la mae no son tan precisos comolos que se adhieren a kahn brazo.   Pídale a kahn proveedor de atención médica que le ayude a escoger un monitor. Traiga el monitor a kahn próxima eleazar médica si necesita ayuda para aprender ausarlo correctamente.   Los pasos que se dan a continuación son instrucciones generales para usar unmonitor digital automatizado.     1. Relájese  Tómese la presión arterial a la misma hora todos los días, ailin temprano en la mañana o al final de la tarde.  Espere al menos rivera hora después de julio fumado, comido o hecho ejercicio. No florian café, té, soda u otras bebidas cafeínadas antes de tomarse la presión.  Siéntese cómodamente a la lopez. Coloque el medidor digital cerca de usted.  Descanse judi algunos minutos antes de empezar.    2. Coloque el manguito  Ponga el brazo sobre la lopez,  con la toscano de la mano hacia arriba. El brazo debe quedar a la altura del corazón. Ajuste el manguito alrededor del brazo, juan por encima del codo. Es mejor colocarlo sobre la piel desnuda, sin ropa. La mayoría de los manguitos le indicarán donde debe alinearlo con la arteria braquial (el vaso sanguíneo en la mitad del brazo en la parte interna del codo). Bernie las instrucciones que vienen con kahn monitor para long rivera ilustración. También puede llevar kahn manguito a la eleazar con kahn proveedor de atención médica para que le muestren cómo usarlo correctamente.    3. Infle el manguito  Oprima el botón para iniciar el bombeo automático.  El manguito carter se aprieta y luego se afloja.  Los números cambian. Cuando paran de cambiar, el medidor indicará kahn presión arterial.  Tómese 2 o 3 lecturas con un minuto de diferencia entre rivera y otra.      4. Anote los resultados  [place] nota de los números de kahn presión arterial, así ailin de la fecha y la hora de la medición. Mantenga los resultados en un solo lugar, por ejemplo en un cuaderno. Incluso si kahn monitor tiene rivera memoria incorporada, mantenga rivera copia por escrito de los resultados.  Quite el manguito del brazo y apague la máquina.  Traiga los resultados de allison lecturas a cada rivera de las citas con kahn proveedor de atención médica. También anote el días si cambió la dosis de kahn medicamento. [place] inforamación debe incuirse en los registros que erma de kahn presión arterial para que le ayude a kahn proveedor de atención médica a evaluar qué tan kim están funcionando los cambios en el medicamento.  Pregúntele a kahn proveedor de atención médica a qué niveles debe obtener ayuda de inmediato.      © 6468-8417 [employer]. Todos los derechos reservados. Esta información no pretende sustituir la atención médica profesional. Sólo sumédico puede diagnosticar y tratar un problema de kalyan.

## 2025-04-08 ENCOUNTER — LAB ENCOUNTER (OUTPATIENT)
Dept: LAB | Age: 50
End: 2025-04-08
Attending: STUDENT IN AN ORGANIZED HEALTH CARE EDUCATION/TRAINING PROGRAM
Payer: COMMERCIAL

## 2025-04-08 DIAGNOSIS — E11.9 TYPE 2 DIABETES MELLITUS WITHOUT COMPLICATION, WITHOUT LONG-TERM CURRENT USE OF INSULIN (HCC): Chronic | ICD-10-CM

## 2025-04-08 DIAGNOSIS — Z00.00 ANNUAL PHYSICAL EXAM: ICD-10-CM

## 2025-04-08 DIAGNOSIS — E55.9 VITAMIN D DEFICIENCY: ICD-10-CM

## 2025-04-08 DIAGNOSIS — Z12.5 SCREENING FOR PROSTATE CANCER: ICD-10-CM

## 2025-04-08 LAB
ALBUMIN SERPL-MCNC: 4.9 G/DL (ref 3.2–4.8)
ALBUMIN/GLOB SERPL: 1.6 {RATIO} (ref 1–2)
ALP LIVER SERPL-CCNC: 79 U/L
ALT SERPL-CCNC: 37 U/L
ANION GAP SERPL CALC-SCNC: 10 MMOL/L (ref 0–18)
AST SERPL-CCNC: 22 U/L (ref ?–34)
BASOPHILS # BLD AUTO: 0.06 X10(3) UL (ref 0–0.2)
BASOPHILS NFR BLD AUTO: 0.8 %
BILIRUB SERPL-MCNC: 0.3 MG/DL (ref 0.3–1.2)
BUN BLD-MCNC: 17 MG/DL (ref 9–23)
BUN/CREAT SERPL: 15.3 (ref 10–20)
CALCIUM BLD-MCNC: 9.7 MG/DL (ref 8.7–10.4)
CHLORIDE SERPL-SCNC: 99 MMOL/L (ref 98–112)
CHOLEST SERPL-MCNC: 211 MG/DL (ref ?–200)
CO2 SERPL-SCNC: 27 MMOL/L (ref 21–32)
COMPLEXED PSA SERPL-MCNC: 2.66 NG/ML (ref ?–4)
CREAT BLD-MCNC: 1.11 MG/DL
CREAT UR-SCNC: 216.4 MG/DL
DEPRECATED RDW RBC AUTO: 37.2 FL (ref 35.1–46.3)
EGFRCR SERPLBLD CKD-EPI 2021: 81 ML/MIN/1.73M2 (ref 60–?)
EOSINOPHIL # BLD AUTO: 0.17 X10(3) UL (ref 0–0.7)
EOSINOPHIL NFR BLD AUTO: 2.3 %
ERYTHROCYTE [DISTWIDTH] IN BLOOD BY AUTOMATED COUNT: 11.6 % (ref 11–15)
FASTING PATIENT LIPID ANSWER: YES
FASTING STATUS PATIENT QL REPORTED: YES
GLOBULIN PLAS-MCNC: 3 G/DL (ref 2–3.5)
GLUCOSE BLD-MCNC: 153 MG/DL (ref 70–99)
HCT VFR BLD AUTO: 44.2 %
HDLC SERPL-MCNC: 36 MG/DL (ref 40–59)
HGB BLD-MCNC: 15.2 G/DL
IMM GRANULOCYTES # BLD AUTO: 0.02 X10(3) UL (ref 0–1)
IMM GRANULOCYTES NFR BLD: 0.3 %
LDLC SERPL CALC-MCNC: 114 MG/DL (ref ?–100)
LYMPHOCYTES # BLD AUTO: 1.4 X10(3) UL (ref 1–4)
LYMPHOCYTES NFR BLD AUTO: 18.6 %
MCH RBC QN AUTO: 29.9 PG (ref 26–34)
MCHC RBC AUTO-ENTMCNC: 34.4 G/DL (ref 31–37)
MCV RBC AUTO: 87 FL
MICROALBUMIN UR-MCNC: 3.2 MG/DL
MICROALBUMIN/CREAT 24H UR-RTO: 14.8 UG/MG (ref ?–30)
MONOCYTES # BLD AUTO: 0.67 X10(3) UL (ref 0.1–1)
MONOCYTES NFR BLD AUTO: 8.9 %
NEUTROPHILS # BLD AUTO: 5.2 X10 (3) UL (ref 1.5–7.7)
NEUTROPHILS # BLD AUTO: 5.2 X10(3) UL (ref 1.5–7.7)
NEUTROPHILS NFR BLD AUTO: 69.1 %
NONHDLC SERPL-MCNC: 175 MG/DL (ref ?–130)
OSMOLALITY SERPL CALC.SUM OF ELEC: 287 MOSM/KG (ref 275–295)
PLATELET # BLD AUTO: 182 10(3)UL (ref 150–450)
POTASSIUM SERPL-SCNC: 4.1 MMOL/L (ref 3.5–5.1)
PROT SERPL-MCNC: 7.9 G/DL (ref 5.7–8.2)
RBC # BLD AUTO: 5.08 X10(6)UL
SODIUM SERPL-SCNC: 136 MMOL/L (ref 136–145)
TRIGL SERPL-MCNC: 353 MG/DL (ref 30–149)
TSI SER-ACNC: 2.93 UIU/ML (ref 0.55–4.78)
VIT D+METAB SERPL-MCNC: 22.7 NG/ML (ref 30–100)
VLDLC SERPL CALC-MCNC: 62 MG/DL (ref 0–30)
WBC # BLD AUTO: 7.5 X10(3) UL (ref 4–11)

## 2025-04-08 PROCEDURE — 84443 ASSAY THYROID STIM HORMONE: CPT

## 2025-04-08 PROCEDURE — 82306 VITAMIN D 25 HYDROXY: CPT

## 2025-04-08 PROCEDURE — 80061 LIPID PANEL: CPT

## 2025-04-08 PROCEDURE — 80053 COMPREHEN METABOLIC PANEL: CPT

## 2025-04-08 PROCEDURE — 36415 COLL VENOUS BLD VENIPUNCTURE: CPT

## 2025-04-08 PROCEDURE — 85025 COMPLETE CBC W/AUTO DIFF WBC: CPT

## 2025-04-08 PROCEDURE — 82043 UR ALBUMIN QUANTITATIVE: CPT

## 2025-04-08 PROCEDURE — 82570 ASSAY OF URINE CREATININE: CPT

## 2025-04-09 NOTE — PROGRESS NOTES
Please relay to patient:  José Luis There!     Dr. Graham here, I have reviewed your labs here are your recommendations:    # Lipids/cholesterol: Your ASCVD, ie 10-year risk score which indicates the potential chance that you could have a heart attack, stroke or death related to cholesterol/heart disease, is GREATER than the 5% cut off. It would be recommended we start a cholesterol medication such as rosuvastatin 10mg to help lower your risk of a heart attack. Let me know if you are willing to start this medication. We should also check the blood vessels of the heart using a 50$ exam called a heart scan/calcium score. If a heart scan/calcium was ordered at our visit, please proceed with scheduling and we will follow up to review your results. This scan result does not change the recommendation to start a cholesterol medication, it simply confirms and provides guidance for next steps for evaluation and treatment of heart disease.     The 10-year ASCVD risk score (Laureano ELDER, et al., 2019) is: 15.4%    Values used to calculate the score:      Age: 49 years      Sex: Male      Is Non- : No      Diabetic: Yes      Tobacco smoker: No      Systolic Blood Pressure: 160 mmHg      Is BP treated: Yes      HDL Cholesterol: 36 mg/dL      Total Cholesterol: 211 mg/dL    # Diabetes/A1C: Your A1C Last A1c value was 6.8% done 4/7/2025. which shows  Diabetes, please continue with treatment as discussed and follow up July 7th 2025.    # TSH: Your thyroid (TSH) function is normal.     # CMP: Your comprehensive metabolic panel shows overall stable functioning kidneys (creatinine, GFR), liver (AST, ALT, Bilirubin), and electrolytes (sodium, potassium, calcium). Slight variations in other values such as BUN/Creat, Serum Osm, anion gap, chloride, etc are not of clinical value at this time.     # CBC: Your complete blood count shows overall stable red blood cells, white blood cells, platelets (help you stop bleeding),  and hematocrit (thickness of blood),  Slight variations in other values such as RDW/sw, MCH are not of clinical value at this time.     # Vitamins: Your vitamin D level is Vitamin D Insufficiency (<30ng/mL) please start 2,000 International Units daily (it is cheaper to purchase from [employer] or your local pharmacy rather than sending a prescription in). will recheck with next annual physical or sooner if clinically indicated      # Prostate: Your prostate level, ie PSA, is normal.     If you have any questions or concerns in regards to these labs please schedule a follow up and will gladly discuss.   -Dr. Graham

## 2025-07-07 ENCOUNTER — OFFICE VISIT (OUTPATIENT)
Dept: INTERNAL MEDICINE CLINIC | Facility: CLINIC | Age: 50
End: 2025-07-07

## 2025-07-07 VITALS
BODY MASS INDEX: 24.59 KG/M2 | HEIGHT: 66 IN | DIASTOLIC BLOOD PRESSURE: 59 MMHG | SYSTOLIC BLOOD PRESSURE: 119 MMHG | HEART RATE: 91 BPM | WEIGHT: 153 LBS

## 2025-07-07 DIAGNOSIS — E11.69 HYPERLIPIDEMIA ASSOCIATED WITH TYPE 2 DIABETES MELLITUS (HCC): ICD-10-CM

## 2025-07-07 DIAGNOSIS — E11.59 HYPERTENSION ASSOCIATED WITH DIABETES (HCC): Chronic | ICD-10-CM

## 2025-07-07 DIAGNOSIS — E78.5 HYPERLIPIDEMIA ASSOCIATED WITH TYPE 2 DIABETES MELLITUS (HCC): ICD-10-CM

## 2025-07-07 DIAGNOSIS — E11.9 TYPE 2 DIABETES MELLITUS WITHOUT COMPLICATION, WITHOUT LONG-TERM CURRENT USE OF INSULIN (HCC): Primary | ICD-10-CM

## 2025-07-07 DIAGNOSIS — I25.83 CORONARY ARTERY DISEASE DUE TO LIPID RICH PLAQUE: ICD-10-CM

## 2025-07-07 DIAGNOSIS — I25.10 CORONARY ARTERY DISEASE DUE TO LIPID RICH PLAQUE: ICD-10-CM

## 2025-07-07 DIAGNOSIS — I15.2 HYPERTENSION ASSOCIATED WITH DIABETES (HCC): Chronic | ICD-10-CM

## 2025-07-07 LAB
CARTRIDGE EXPIRATION DATE: ABNORMAL DATE
HEMOGLOBIN A1C: 6.8 % (ref 4.3–5.6)

## 2025-07-07 PROCEDURE — 83036 HEMOGLOBIN GLYCOSYLATED A1C: CPT | Performed by: STUDENT IN AN ORGANIZED HEALTH CARE EDUCATION/TRAINING PROGRAM

## 2025-07-07 PROCEDURE — 99215 OFFICE O/P EST HI 40 MIN: CPT | Performed by: STUDENT IN AN ORGANIZED HEALTH CARE EDUCATION/TRAINING PROGRAM

## 2025-07-07 RX ORDER — LOSARTAN POTASSIUM 100 MG/1
100 TABLET ORAL NIGHTLY
Qty: 90 TABLET | Refills: 3 | Status: SHIPPED | OUTPATIENT
Start: 2025-07-07

## 2025-07-07 RX ORDER — METFORMIN HYDROCHLORIDE 750 MG/1
750 TABLET ORAL 2 TIMES DAILY WITH MEALS
Qty: 180 TABLET | Refills: 3 | Status: SHIPPED | OUTPATIENT
Start: 2025-07-07

## 2025-07-07 RX ORDER — ROSUVASTATIN CALCIUM 10 MG/1
10 TABLET, COATED ORAL NIGHTLY
Qty: 90 TABLET | Refills: 3 | Status: SHIPPED | OUTPATIENT
Start: 2025-07-07

## 2025-07-07 NOTE — PROGRESS NOTES
OFFICE NOTE       The following individual(s) verbally consented to be recorded using ambient AI listening technology and understand that they can each withdraw their consent to this listening technology at any point by asking the clinician to turn off or pause the recording:    Patient name: Dawson Ibarra  Additional names:           Patient ID: Dawson Ibarra is a 50 year old male.  Today's Date: 07/07/25  Chief Complaint: Diabetes and Hypertension    Anjali [id number]    History of Present Illness  Dawson Ibarra is a 50 year old male with type two diabetes and hypertension who presents for blood pressure and diabetes follow-up.    His blood pressure is currently well-controlled at 119/59 mmHg. He is taking losartan 100 mg at night for hypertension.    Regarding diabetes management, his current A1c is 6.8%, unchanged from the last reading. In April 2024, his A1c was 5.8% while on metformin and Ozempic. He currently takes metformin 500 mg twice daily but sometimes forgets the second dose. He has not been on Ozempic recently and has not experienced any side effects from it in the past. He acknowledges not being careful with his diet and believes dietary changes could improve his blood sugar levels.    He is also on rosuvastatin 10 mg for hyperlipidemia. He has a history of nonalcoholic fatty liver disease, colon diverticulosis, coronary and peripheral atherosclerosis, and left lower lung nodules.    He recently completed a diabetes eye exam successfully and a colon cancer screening in December, which was negative.       A1C is 6.8  Lab Results   Component Value Date    A1C 6.8 (A) 07/07/2025    A1C 6.8 (A) 04/07/2025    A1C 5.8 (A) 04/20/2024    A1C 9.1 (A) 02/24/2024    A1C 12.7 (A) 01/22/2024         Vitals:    07/07/25 1719   BP: 119/59   Pulse: 91   Weight: 153 lb (69.4 kg)   Height: 5' 6\" (1.676 m)     body mass index is 24.69 kg/m².  BP Readings from Last 3 Encounters:   07/07/25  119/59   04/07/25 160/84   03/12/25 125/83     The 10-year ASCVD risk score (Laureano ELDER, et al., 2019) is: 10.2%    Values used to calculate the score:      Age: 50 years      Sex: Male      Is Non- : No      Diabetic: Yes      Tobacco smoker: No      Systolic Blood Pressure: 119 mmHg      Is BP treated: Yes      HDL Cholesterol: 36 mg/dL      Total Cholesterol: 211 mg/dL  Results  LABS  A1c: 6.8 (07/07/2025)  A1c: 5.8 (04/2024)    DIAGNOSTIC  Colonoscopy: Negative (12/2024)       Medications reviewed:  Current Medications[1]      Assessment & Plan    1. Type 2 diabetes mellitus without complication, without long-term current use of insulin (HCC) (Primary)  -     POC Hemoglobin A1C  -     metFORMIN HCl; Take 750 mg by mouth 2 (two) times daily with meals. For DIABETES  Dispense: 180 tablet; Refill: 3  2. Hyperlipidemia associated with type 2 diabetes mellitus (HCC)  -     Rosuvastatin Calcium; Take 1 tablet (10 mg total) by mouth nightly. FOR CHOLESTEROL.  Dispense: 90 tablet; Refill: 3  3. Coronary artery disease due to lipid rich plaque  4. Hypertension associated with diabetes (HCC)  -     Losartan Potassium; Take 1 tablet (100 mg total) by mouth at bedtime. For Blood Pressure and Heart  Dispense: 90 tablet; Refill: 3    Assessment & Plan  Type 2 Diabetes Mellitus  A1c increased to 6.8% from 5.8% in April 2024. Non-adherence to diet and metformin may contribute. Prefers metformin and dietary management over Ozempic.  - Increase metformin to 750 mg twice daily. Take 1.5 pills of current 500 mg dose until finished, then switch to 750 mg tablets.  - Emphasized dietary modifications for glycemic control.  - Schedule follow-up for diabetes management in late November or early December.    Hypertension  Blood pressure controlled at 119/59 mmHg on losartan 100 mg.  - Continue losartan 100 mg at night.    Hyperlipidemia  On rosuvastatin 10 mg for cholesterol management and cardiovascular  protection.  - Continue rosuvastatin 10 mg daily.    [facility]  Completed colon cancer screening in December, negative result.  - Repeat colon cancer screening in December 2027.       Follow Up: As needed/if symptoms worsen or Return in about 21 weeks (around 12/1/2025) for Diabetes follow up ..        Objective/ Results:   Physical Exam  Constitutional:       Appearance: He is well-developed.   Cardiovascular:      Rate and Rhythm: Normal rate and regular rhythm.      Heart sounds: Normal heart sounds.   Pulmonary:      Effort: Pulmonary effort is normal.      Breath sounds: Normal breath sounds.   Abdominal:      General: Bowel sounds are normal.      Palpations: Abdomen is soft.   Skin:     General: Skin is warm and dry.   Neurological:      Mental Status: He is alert and oriented to person, place, and time.      Deep Tendon Reflexes: Reflexes are normal and symmetric.        Physical Exam  VITALS: BP- 119/59     Reviewed:    Patient Active Problem List    Diagnosis    Type 2 diabetes mellitus without complication, without long-term current use of insulin (HCC)      Allergies[2]   Short Social Hx on File[3]   Review of Systems   Constitutional: Negative.    Respiratory: Negative.     Cardiovascular: Negative.    Gastrointestinal: Negative.    Skin: Negative.    Neurological: Negative.        All other systems negative unless otherwise stated in ROS or HPI above.       Santo Graham MD  Internal Medicine       Call office with any questions or seek emergency care if necessary.   Patient understands and agrees to follow directions and advice.      ----------------------------------------- PATIENT INSTRUCTIONS-----------------------------------------     There are no Patient Instructions on file for this visit.        The 21st Century Cures Act makes medical notes available to patients in the interest of transparency.  However, please be advised that this is a medical document.  It is intended as a  peer to peer communication.  It is written in medical language and may contain abbreviations or verbiage that are technical and unfamiliar.  It may appear blunt or direct.  Medical documents are intended to carry relevant information, facts as evident, and the clinical opinion of the practitioner.          [1]   Current Outpatient Medications   Medication Sig Dispense Refill    metFORMIN 750 MG Oral Tab Take 750 mg by mouth 2 (two) times daily with meals. For DIABETES 180 tablet 3    losartan 100 MG Oral Tab Take 1 tablet (100 mg total) by mouth at bedtime. For Blood Pressure and Heart 90 tablet 3    rosuvastatin 10 MG Oral Tab Take 1 tablet (10 mg total) by mouth nightly. FOR CHOLESTEROL. 90 tablet 3   [2] No Known Allergies  [3]   Social History  Socioeconomic History    Marital status:    Tobacco Use    Smoking status: Never     Passive exposure: Never    Smokeless tobacco: Never   Vaping Use    Vaping status: Never Used   Substance and Sexual Activity    Alcohol use: Yes     Comment: social    Drug use: Never

## 2025-07-08 ENCOUNTER — TELEPHONE (OUTPATIENT)
Dept: INTERNAL MEDICINE CLINIC | Facility: CLINIC | Age: 50
End: 2025-07-08

## 2025-07-08 DIAGNOSIS — E11.9 TYPE 2 DIABETES MELLITUS WITHOUT COMPLICATION, WITHOUT LONG-TERM CURRENT USE OF INSULIN (HCC): ICD-10-CM

## 2025-07-08 NOTE — TELEPHONE ENCOUNTER
Pharmacy states Metformin 750 mg only comes in extended release and was clarifying if Dr Graham still wants ordered as BID.  Please advise

## (undated) DEVICE — MONOPOLAR CURVED SCISSORS: Brand: ENDOWRIST

## (undated) DEVICE — DEVICE LAP SUT PRT 150MM G 14GA TWO TRCR DEV

## (undated) DEVICE — DRAPE,ABDOMINAL,MAJOR,STERILE: Brand: MEDLINE

## (undated) DEVICE — COLUMN DRAPE

## (undated) DEVICE — SOLUTION IRRIG 1000ML 0.9% NACL USP BTL

## (undated) DEVICE — APPLICATOR PREP 26ML CHG 2% ISO ALC 70%

## (undated) DEVICE — C-ARM: Brand: UNBRANDED

## (undated) DEVICE — SUT MCRYL 3-0 18IN PS-2 ABSRB UD 19MM 3/8 CIR

## (undated) DEVICE — ARM DRAPE

## (undated) DEVICE — ROBOTIC: Brand: MEDLINE INDUSTRIES, INC.

## (undated) DEVICE — SEAL

## (undated) DEVICE — ABSORBABLE WOUND CLOSURE DEVICE: Brand: V-LOC 90

## (undated) DEVICE — VISUALIZATION SYSTEM: Brand: CLEARIFY

## (undated) DEVICE — FENESTRATED BIPOLAR FORCEPS: Brand: ENDOWRIST

## (undated) DEVICE — INSUFFLATION NEEDLE TO ESTABLISH PNEUMOPERITONEUM.: Brand: INSUFFLATION NEEDLE

## (undated) DEVICE — TIP COVER ACCESSORY

## (undated) DEVICE — MEGA SUTURECUT ND: Brand: ENDOWRIST

## (undated) DEVICE — PAD POS 36IN DISP SURGYPAD

## (undated) DEVICE — GLOVE SUR 7.5 SENSICARE PI MIC PIP CRM PWD F

## (undated) DEVICE — TUBING MEGADYNE LAPAROSCOPIC

## (undated) DEVICE — BLADELESS OBTURATOR: Brand: WECK VISTA

## (undated) NOTE — LETTER
4/2/2025          To Whom It May Concern:    Dawson Ibarra is currently under my medical care. He may return to work on 4/2/2025.  Activity restrictions as follows: none.    If you require additional information please contact our office.        Sincerely,          CHRISTY Duvall          Document generated by:  CALLIE OLIVIER RN

## (undated) NOTE — LETTER
4/7/2025          To Whom It May Concern:    Dawson Ibarra is currently under my medical care and has a medical condition with lifelong restrictions. Activity is restricted as follows: no lifting over 25 lbs for indefinite time period .    If you require additional information please contact our office.        Sincerely,    Santo Graham MD          Document generated by:  Santo Graham MD

## (undated) NOTE — LETTER
3/12/2025          To Whom It May Concern:    Dawson Ibarra is currently under my medical care and may not return to work  Until April 2nd.      If you require additional information please contact our office.        Sincerely,        [facility] SURG PA          Document generated by:  ANSHUL

## (undated) NOTE — LETTER
[facility], [facility]  [address]  [place] [address]  PH: [phone]  FAX: [phone]        24  Dawson Ibarra, :  1975  [address] [place] [address]      José Luis This is Dr. Graham,    given your age of greater than 45 I highly recommend you get your colorectal cancer screening if you decline screening colonoscopy a non invasive option is a stool Cologuard test which is done every three years. For average risk patients.     I have placed an order for Cologuard which detects stool sample for cancer was placed and will be shipped to your home within the next 48 hours and has a return UPS pre-paid postage to send back in the mail to schedule a online pickup from home or UPS store Please complete this within the next week to ensure Dr. Graham is delivering High quality/value care to his patients. If you have any questions, please schedule follow up with me   -Dr Graham